# Patient Record
Sex: FEMALE | Race: WHITE | NOT HISPANIC OR LATINO | Employment: FULL TIME | URBAN - METROPOLITAN AREA
[De-identification: names, ages, dates, MRNs, and addresses within clinical notes are randomized per-mention and may not be internally consistent; named-entity substitution may affect disease eponyms.]

---

## 2022-07-08 LAB
LEFT EYE DIABETIC RETINOPATHY: NORMAL
RIGHT EYE DIABETIC RETINOPATHY: NORMAL
SEVERITY (EYE EXAM): NORMAL

## 2022-12-05 ENCOUNTER — OFFICE VISIT (OUTPATIENT)
Dept: FAMILY MEDICINE CLINIC | Facility: CLINIC | Age: 42
End: 2022-12-05

## 2022-12-05 VITALS
TEMPERATURE: 98.8 F | SYSTOLIC BLOOD PRESSURE: 128 MMHG | HEIGHT: 61 IN | WEIGHT: 275 LBS | DIASTOLIC BLOOD PRESSURE: 74 MMHG | OXYGEN SATURATION: 98 % | HEART RATE: 66 BPM | BODY MASS INDEX: 51.92 KG/M2

## 2022-12-05 DIAGNOSIS — J40 BRONCHITIS: Primary | ICD-10-CM

## 2022-12-05 PROBLEM — E66.01 MORBID OBESITY WITH BMI OF 50.0-59.9, ADULT (HCC): Status: ACTIVE | Noted: 2022-12-05

## 2022-12-05 RX ORDER — NORETHINDRONE ACETATE AND ETHINYL ESTRADIOL AND FERROUS FUMARATE 1MG-20(21)
1 KIT ORAL DAILY
COMMUNITY
Start: 2022-09-18

## 2022-12-05 RX ORDER — METHYLPREDNISOLONE 4 MG/1
TABLET ORAL
Qty: 21 EACH | Refills: 0
Start: 2022-12-05

## 2022-12-05 NOTE — PATIENT INSTRUCTIONS
Acute Bronchitis   AMBULATORY CARE:   Acute bronchitis  is swelling and irritation in your lungs  It is usually caused by a virus and most often happens in the winter  Bronchitis may also be caused by bacteria or by a chemical irritant, such as smoke  Common symptoms include the following:   Cough that lasts up to 3 weeks, stuffy nose    Hoarseness, sore throat    A fever and chills    Feeling more tired than usual, and body aches    Wheezing or pain when you breathe or cough    Seek care immediately if:   You cough up blood  Your lips or fingernails turn blue  You feel like you are not getting enough air when you breathe  Call your doctor if:   Your symptoms do not go away or get worse, even after treatment  Your cough does not get better within 4 weeks  You have questions or concerns about your condition or care  Medicines: You may  need any of the following:  Cough suppressants  decrease your urge to cough  Decongestants  help loosen mucus in your lungs and make it easier to cough up  This can help you breathe easier  Inhalers  may be given  Your healthcare provider may give you one or more inhalers to help you breathe easier and cough less  An inhaler gives your medicine to open your airways  Ask your healthcare provider to show you how to use your inhaler correctly  Antibiotics  may be given for up to 5 days if your bronchitis is caused by bacteria  Acetaminophen  decreases pain and fever  It is available without a doctor's order  Ask how much to take and how often to take it  Follow directions  Read the labels of all other medicines you are using to see if they also contain acetaminophen, or ask your doctor or pharmacist  Acetaminophen can cause liver damage if not taken correctly  Do not use more than 4 grams (4,000 milligrams) total of acetaminophen in one day  NSAIDs  help decrease swelling and pain or fever   This medicine is available with or without a doctor's order  NSAIDs can cause stomach bleeding or kidney problems in certain people  If you take blood thinner medicine, always ask your healthcare provider if NSAIDs are safe for you  Always read the medicine label and follow directions  Take your medicine as directed  Contact your healthcare provider if you think your medicine is not helping or if you have side effects  Tell him of her if you are allergic to any medicine  Keep a list of the medicines, vitamins, and herbs you take  Include the amounts, and when and why you take them  Bring the list or the pill bottles to follow-up visits  Carry your medicine list with you in case of an emergency  Self-care:   Drink liquids as directed  You may need to drink more liquids than usual to stay hydrated  Ask how much liquid to drink each day and which liquids are best for you  Use a cool mist humidifier  to increase air moisture in your home  This may make it easier for you to breathe and help decrease your cough  Get more rest   Rest helps your body to heal  Slowly start to do more each day  Rest when you feel it is needed  Avoid irritants in the air  Avoid chemicals, fumes, and dust  Wear a face mask if you must work around dust or fumes  Stay inside on days when air pollution levels are high  If you have allergies, stay inside when pollen counts are high  Do not use aerosol products, such as spray-on deodorant, bug spray, and hair spray  Do not smoke or be around others who are smoking  Nicotine and other chemicals in cigarettes and cigars can cause lung damage  Ask your healthcare provider for information if you currently smoke and need help to quit  E-cigarettes or smokeless tobacco still contain nicotine  Talk to your healthcare provider before you use these products  Prevent acute bronchitis:       Ask about vaccines you may need  Get a flu vaccine each year as soon as recommended, usually in September or October   Ask your healthcare provider if you should also get a pneumonia or COVID-19 vaccine  Your healthcare provider can tell you if you should also get other vaccines, and when to get them  Prevent the spread of germs  You can decrease your risk for acute bronchitis and other illnesses by doing the following:    Wash your hands often with soap and water  Carry germ-killing hand lotion or gel with you  You can use the lotion or gel to clean your hands when soap and water are not available  Do not touch your eyes, nose, or mouth unless you have washed your hands first     Always cover your mouth when you cough to prevent the spread of germs  It is best to cough into a tissue or your shirt sleeve instead of into your hand  Ask those around you to cover their mouths when they cough  Try to avoid people who have a cold or the flu  If you are sick, stay away from others as much as possible  Follow up with your doctor as directed:  Write down questions you have so you will remember to ask them during your follow-up visits  © Copyright [employer] 2022 Information is for End User's use only and may not be sold, redistributed or otherwise used for commercial purposes  All illustrations and images included in CareNotes® are the copyrighted property of A D A M , Inc  or Sherwin Baldwin   The above information is an  only  It is not intended as medical advice for individual conditions or treatments  Talk to your doctor, nurse or pharmacist before following any medical regimen to see if it is safe and effective for you

## 2022-12-05 NOTE — PROGRESS NOTES
Name: Kristi Castañeda      : 1980      MRN: [id number]  Encounter Provider: MARCY Valle  Encounter Date: 2022   [facility]    Assessment & Plan     Presentation is consistent with bronchitis, likely viral in origin  Patient recently completed antibiotics in mid November for a sinus infection, no indication for repeat antibiotics today  To assist with respiratory symptoms, provided patient with a Medrol Dosepak from our office supply to initiate today  Also advised patient to continue with rest, hydration, and OTC Mucinex  Discussed what signs and symptoms warrant further medical attention, patient verbalized understanding  1  Bronchitis  -     methylPREDNISolone 4 MG tablet therapy pack; Use as directed on package         Subjective      Primary complaint: chest tightness  Onset of illness: Lingering since end   Corresponding symptoms: cough (non-productive)  Course (improving, worsening, stable): "up and down,,, day to day"  Fever (TMAX): none  Respiratory Symptoms?: wheezing "time to time"  Recent sick contacts: 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  Recent COVID dx/test: has not tested  Hx of similar illnesses?: Had recurrent pneumonia as a child  Treatments?: taking Mucinex DM, minimal relief    Had a virtual visit on  with [place], provided with Augmentin (completed, 10 days)        Review of Systems   Constitutional: Positive for fatigue  HENT: Negative  Negative for congestion, rhinorrhea and sore throat  Eyes: Negative  Respiratory: Positive for cough, chest tightness and wheezing  Cardiovascular: Negative  Gastrointestinal: Negative  Endocrine: Negative  Genitourinary: Negative  Musculoskeletal: Negative  Skin: Negative  Allergic/Immunologic: Negative  Neurological: Negative  Hematological: Negative  Psychiatric/Behavioral: Negative  Current Outpatient Medications on File Prior to Visit   Medication Sig   • Junel FE 1/20 1-20 MG-MCG per tablet Take 1 tablet by mouth daily       Objective     /74 (BP Location: Left arm, Patient Position: Sitting, Cuff Size: Large)   Pulse 66   Temp 98 8 °F (37 1 °C)   Ht 5' 1" (1 549 m)   Wt 125 kg (275 lb)   SpO2 98%   BMI 51 96 kg/m²     Physical Exam  Constitutional:       General: She is not in acute distress  Appearance: Normal appearance  She is ill-appearing  HENT:      Head: Normocephalic  Right Ear: Tympanic membrane normal       Left Ear: Tympanic membrane normal       Nose: Nose normal  No congestion or rhinorrhea  Right Sinus: No maxillary sinus tenderness or frontal sinus tenderness  Left Sinus: No maxillary sinus tenderness or frontal sinus tenderness  Mouth/Throat:      Mouth: Mucous membranes are moist       Pharynx: Oropharynx is clear  No oropharyngeal exudate or posterior oropharyngeal erythema  Eyes:      Conjunctiva/sclera: Conjunctivae normal    Cardiovascular:      Rate and Rhythm: Normal rate and regular rhythm  Pulses: Normal pulses  Heart sounds: Normal heart sounds  Pulmonary:      Effort: Pulmonary effort is normal  No respiratory distress  Breath sounds: Examination of the right-lower field reveals wheezing  Wheezing present  Musculoskeletal:      Cervical back: Normal range of motion  Lymphadenopathy:      Cervical: No cervical adenopathy  Skin:     General: Skin is warm and dry  Findings: No rash  Neurological:      General: No focal deficit present  Mental Status: She is alert and oriented to person, place, and time     Psychiatric:         Mood and Affect: Mood normal          Behavior: Behavior normal        MACRY Olson

## 2022-12-09 ENCOUNTER — OFFICE VISIT (OUTPATIENT)
Dept: FAMILY MEDICINE CLINIC | Facility: CLINIC | Age: 42
End: 2022-12-09

## 2022-12-09 VITALS
OXYGEN SATURATION: 98 % | SYSTOLIC BLOOD PRESSURE: 108 MMHG | WEIGHT: 275 LBS | DIASTOLIC BLOOD PRESSURE: 64 MMHG | TEMPERATURE: 97.3 F | BODY MASS INDEX: 51.92 KG/M2 | HEIGHT: 61 IN | HEART RATE: 62 BPM

## 2022-12-09 DIAGNOSIS — B96.89 ACUTE BACTERIAL BRONCHITIS: Primary | ICD-10-CM

## 2022-12-09 DIAGNOSIS — J20.8 ACUTE BACTERIAL BRONCHITIS: Primary | ICD-10-CM

## 2022-12-09 RX ORDER — ASCORBIC ACID 500 MG
500 TABLET ORAL
COMMUNITY

## 2022-12-09 RX ORDER — DOXYCYCLINE HYCLATE 100 MG/1
100 CAPSULE ORAL EVERY 12 HOURS SCHEDULED
Qty: 14 CAPSULE | Refills: 0 | Status: SHIPPED | OUTPATIENT
Start: 2022-12-09 | End: 2022-12-16

## 2022-12-09 NOTE — PROGRESS NOTES
Name: Niraj Chaparro      : 1980      MRN: [id number]  Encounter Provider: MARCY De La Cruz  Encounter Date: 2022   Encounter department: [facility] [address] [facility]    Assessment & Plan     Due to length of symptoms and failure of condition to improve with p o  steroids, current differentials include: Bacterial bronchitis vs viral bronchitis vs unspecified URI  Based on shared decision making, patient agreed to start doxycycline twice daily x7 days in conjunction with the remainder of her methylprednisolone pack  Advised patient to continue with conservative measures: rest, hydration, and OTC Mucinex  Discussed what signs and symptoms warrant emergent medical care  Patient verbalized understanding  1  Acute bacterial bronchitis  -     doxycycline hyclate (VIBRAMYCIN) 100 mg capsule; Take 1 capsule (100 mg total) by mouth every 12 (twelve) hours for 7 days         Subjective      Saw pt on Monday, dx with bronchitis, started on PO steroid pack    Primary complaint: cough (productive, think white color), chest tightness  Onset of illness: lingering since November   Course (improving, worsening, stable): "still worsening"  Fever (TMAX): no recent fevers  Respiratory Symptoms?: "more labored breathing"  Hx of similar illnesses?: "hx of chest illnesses"  Recent antibiotics: Was on Augmentin in early November   Treatments?: mucinex dm and steroid pack    minimal relief      Review of Systems   Constitutional: Positive for fatigue  HENT: Negative  Negative for congestion, sinus pressure, sinus pain and sore throat  Eyes: Negative  Respiratory: Positive for cough, chest tightness and shortness of breath  Cardiovascular: Negative  Gastrointestinal: Negative  Endocrine: Negative  Genitourinary: Negative  Musculoskeletal: Negative  Skin: Negative  Allergic/Immunologic: Negative  Neurological: Negative  Hematological: Negative  Psychiatric/Behavioral: Negative  Current Outpatient Medications on File Prior to Visit   Medication Sig   • ascorbic acid (VITAMIN C) 500 MG tablet Take 500 mg by mouth   • Junel FE 1/20 1-20 MG-MCG per tablet Take 1 tablet by mouth daily   • methylPREDNISolone 4 MG tablet therapy pack Use as directed on package       Objective     /64 (BP Location: Left arm, Patient Position: Sitting, Cuff Size: Large)   Pulse 62   Temp (!) 97 3 °F (36 3 °C)   Ht 5' 1" (1 549 m)   Wt 125 kg (275 lb)   SpO2 98%   BMI 51 96 kg/m²     Physical Exam  Constitutional:       General: She is not in acute distress  Appearance: Normal appearance  She is obese  She is ill-appearing  HENT:      Head: Normocephalic  Right Ear: Tympanic membrane normal       Left Ear: Tympanic membrane normal       Nose: Nose normal  No congestion or rhinorrhea  Mouth/Throat:      Mouth: Mucous membranes are moist       Pharynx: Oropharynx is clear  No oropharyngeal exudate or posterior oropharyngeal erythema  Eyes:      Conjunctiva/sclera: Conjunctivae normal    Cardiovascular:      Rate and Rhythm: Normal rate and regular rhythm  Pulses: Normal pulses  Heart sounds: Normal heart sounds  Pulmonary:      Effort: Pulmonary effort is normal       Breath sounds: Wheezing present  Musculoskeletal:      Cervical back: Normal range of motion  Lymphadenopathy:      Cervical: No cervical adenopathy  Skin:     General: Skin is warm and dry  Findings: No rash  Neurological:      General: No focal deficit present  Mental Status: She is alert and oriented to person, place, and time     Psychiatric:         Mood and Affect: Mood normal          Behavior: Behavior normal        MARCY Winter

## 2022-12-09 NOTE — PATIENT INSTRUCTIONS
Doxycycline (By injection)   Doxycycline ([id number])  Treats infections  This medicine is a tetracycline antibiotic  Brand Name(s): Doxy 100, Doxy 100 Novaplus, PremierPro Rx Doxy 100   There may be other brand names for this medicine  When This Medicine Should Not Be Used: This medicine is not right for everyone  You should not receive it if you had an allergic reaction to doxycycline or any tetracycline antibiotic, or if you are pregnant  How to Use This Medicine:   Injectable  Your doctor will prescribe your dose and schedule  This medicine is given through a needle placed in a vein  A nurse or other health provider will give you this medicine  Missed dose: Call your doctor or pharmacist for instructions  Drugs and Foods to Avoid:   Ask your doctor or pharmacist before using any other medicine, including over-the-counter medicines, vitamins, and herbal products  Some medicines can affect how doxycycline works  Tell your doctor if you are also using the following:   Penicillin  A blood thinner (such as warfarin)  Warnings While Using This Medicine:   Tell your doctor if you are breastfeeding, or if you have liver disease or lupus  This medicine can cause diarrhea  Call your doctor if the diarrhea becomes severe, does not stop, or is bloody  Do not take any medicine to stop diarrhea until you have talked to your doctor  Diarrhea can occur 2 months or more after you stop taking this medicine  This medicine may make your skin more sensitive to sunlight  Wear sunscreen  Do not use sunlamps or tanning beds  Possible Side Effects While Using This Medicine:   Call your doctor right away if you notice any of these side effects:   Allergic reaction: Itching or hives, swelling in your face or hands, swelling or tingling in your mouth or throat, chest tightness, trouble breathing  Blistering, peeling, red skin rash  Severe diarrhea that may be bloody, stomach cramps, fever  If you notice these less serious side effects, talk with your doctor:   Mild diarrhea, nausea, vomiting, loss of appetite  Trouble swallowing  If you notice other side effects that you think are caused by this medicine, tell your doctor  Call your doctor for medical advice about side effects  You may report side effects to FDA at 5-011-FDA-6987    © Copyright [employer] 2022 Information is for End User's use only and may not be sold, redistributed or otherwise used for commercial purposes  The above information is an  only  It is not intended as medical advice for individual conditions or treatments  Talk to your doctor, nurse or pharmacist before following any medical regimen to see if it is safe and effective for you

## 2022-12-21 ENCOUNTER — RA CDI HCC (OUTPATIENT)
Dept: OTHER | Facility: HOSPITAL | Age: 42
End: 2022-12-21

## 2022-12-21 NOTE — PROGRESS NOTES
[facility]  [facility]       Chart reviewed, no opportunity found: CHART REVIEWED, NO OPPORTUNITY FOUND        Patients Insurance        Commercial Insurance: [address] [place] [address]

## 2022-12-27 PROBLEM — R73.09 ELEVATED HEMOGLOBIN A1C MEASUREMENT: Status: ACTIVE | Noted: 2022-12-27

## 2022-12-27 NOTE — PROGRESS NOTES
ADULT ANNUAL [address] IN PARTNERSHIP WITH [facility]    NAME: Toribio Rios  AGE: 38 y o  SEX: female  : 1980     DATE: 2022     Assessment and Plan:     Presents to establish care, primary concern is obesity    Last PAP 3/7/22 per [facility] records ([facility]), mammogram up to date    Baseline labs ordered and collected today    A1c was 7 2 today, increased from 6 3 last year  If plasma glucose comes back elevated, a diagnosis of diabetes type 2 will be confirmed  Offered to start patient on metformin today, patient stated that she took this medication in the past and had some serious gastrointestinal side effects  Patient desires to start a 3-month regimen of diet and exercise with care management team   We will follow-up in 3 months for repeat A1c and possible med iniatition       Follow-up in 3 months for repeat A1c      Problem List Items Addressed This Visit        Other    Morbid obesity with BMI of 50 0-59 9, adult ([facility] [address] )    Relevant Orders    Comprehensive metabolic panel    Lipid panel    POCT hemoglobin A1c (Completed)    Ambulatory referral to complex care management program    TSH, 3rd generation with Free T4 reflex    Metabolic syndrome    Relevant Orders    Comprehensive metabolic panel    Lipid panel    TSH, 3rd generation with Free T4 reflex    Elevated hemoglobin A1c measurement    Relevant Orders    POCT hemoglobin A1c (Completed)   Other Visit Diagnoses     Annual physical exam    -  Primary    Relevant Orders    CBC and differential    Comprehensive metabolic panel    Lipid panel    TSH, 3rd generation with Free T4 reflex    Need for hepatitis C screening test        Relevant Orders    Hepatitis C Ab W/Refl To HCV RNA, Qn, PCR    Screening for HIV (human immunodeficiency virus)        Relevant Orders    Human Immunodeficiency Virus 1/2 Antigen / Antibody ( Fourth Generation) with Reflex Testing          Immunizations and preventive care screenings were discussed with patient today  Appropriate education was printed on patient's after visit summary  Counseling:  Exercise: the importance of regular exercise/physical activity was discussed  Recommend exercise 3-5 times per week for at least 30 minutes  BMI Counseling: There is no height or weight on file to calculate BMI  The BMI is above normal  Nutrition recommendations include decreasing portion sizes and consuming healthier snacks  Exercise recommendations include exercising 3-5 times per week  No pharmacotherapy was ordered  Rationale for BMI follow-up plan is due to patient being overweight or obese  Return in about 3 months (around 3/29/2023) for Recheck  Chief Complaint:     Chief Complaint   Patient presents with   • Annual Exam      History of Present Illness:     Adult Annual Physical   Patient here for a comprehensive physical exam  The patient reports problems - weight gain  Diet and Physical Activity  Diet/Nutrition: poor diet  Exercise: no formal exercise  Depression Screening  PHQ-2/9 Depression Screening         General Health  Sleep: sleeps well  Hearing: normal - bilateral   Vision: wears glasses  Dental: regular dental visits  Review of Systems:     Review of Systems   Constitutional: Negative  HENT: Negative  Eyes: Negative  Respiratory: Negative  Cardiovascular: Negative  Gastrointestinal: Negative  Endocrine: Negative  Genitourinary: Negative  Musculoskeletal: Negative  Skin: Negative  Allergic/Immunologic: Negative  Neurological: Negative  Negative for weakness and numbness  Hematological: Negative  Psychiatric/Behavioral: Negative         Past Medical History:     Past Medical History:   Diagnosis Date   • Allergic     seasonal   • Obesity       Past Surgical History:     Past Surgical History:   Procedure Laterality Date   •  SECTION  2013   • TUBAL LIGATION  2014      Social History:     Social History     Socioeconomic History   • Marital status: /Civil Union     Spouse name: None   • Number of children: None   • Years of education: None   • Highest education level: None   Occupational History   • None   Tobacco Use   • Smoking status: Never   • Smokeless tobacco: Never   Substance and Sexual Activity   • Alcohol use: Yes     Alcohol/week: 2 0 standard drinks     Types: 2 Glasses of wine per week   • Drug use: Never   • Sexual activity: Yes     Partners: Male     Birth control/protection: Other   Other Topics Concern   • None   Social History Narrative   • None     Social Determinants of Health     Financial Resource Strain: Not on file   Food Insecurity: Not on file   Transportation Needs: Not on file   Physical Activity: Not on file   Stress: Not on file   Social Connections: Not on file   Intimate Partner Violence: Not on file   Housing Stability: Not on file      Family History:     Family History   Problem Relation Age of Onset   • Hypertension Mother    • Arthritis Mother            • Stroke Maternal Grandmother            • Coronary artery disease Maternal Aunt               Current Medications:     Current Outpatient Medications   Medication Sig Dispense Refill   • ascorbic acid (VITAMIN C) 500 MG tablet Take 500 mg by mouth     • Junel FE  1-20 MG-MCG per tablet Take 1 tablet by mouth daily     • loratadine 5 mg/5 mL syrup Take 10 mg by mouth daily     • methylPREDNISolone 4 MG tablet therapy pack Use as directed on package (Patient not taking: Reported on 2022) 21 each 0     No current facility-administered medications for this visit        Allergies:     No Known Allergies   Physical Exam:     BP 96/58 (BP Location: Left arm, Patient Position: Sitting)   Pulse 86   Temp 97 5 °F (36 4 °C)   Ht 5' 1" (1 549 m)   Wt 123 kg (272 lb)   SpO2 96%   BMI 51 39 kg/m²     Physical Exam  Vitals and nursing note reviewed  Constitutional:       Appearance: Normal appearance  She is obese  HENT:      Head: Normocephalic  Right Ear: Tympanic membrane normal       Left Ear: Tympanic membrane normal       Nose: Nose normal  No congestion  Mouth/Throat:      Mouth: Mucous membranes are moist       Pharynx: Oropharynx is clear  No oropharyngeal exudate  Eyes:      Extraocular Movements: Extraocular movements intact  Conjunctiva/sclera: Conjunctivae normal       Pupils: Pupils are equal, round, and reactive to light  Cardiovascular:      Rate and Rhythm: Normal rate and regular rhythm  Pulses: Normal pulses  Heart sounds: Normal heart sounds  No murmur heard  Pulmonary:      Effort: Pulmonary effort is normal  No respiratory distress  Breath sounds: Normal breath sounds  No wheezing  Abdominal:      General: Bowel sounds are normal       Palpations: Abdomen is soft  Tenderness: There is no abdominal tenderness  Musculoskeletal:         General: No swelling, tenderness, deformity or signs of injury  Normal range of motion  Cervical back: Normal range of motion and neck supple  No tenderness  Right lower leg: No edema  Left lower leg: No edema  Lymphadenopathy:      Cervical: No cervical adenopathy  Skin:     General: Skin is warm and dry  Capillary Refill: Capillary refill takes less than 2 seconds  Findings: No rash  Neurological:      General: No focal deficit present  Mental Status: She is alert and oriented to person, place, and time  Cranial Nerves: No cranial nerve deficit  Sensory: No sensory deficit  Motor: No weakness        Coordination: Coordination normal       Gait: Gait normal       Deep Tendon Reflexes: Reflexes normal    Psychiatric:         Mood and Affect: Mood normal          Behavior: Behavior normal           Monique Earing, [address] PARTNERSHIP WITH [facility]

## 2022-12-27 NOTE — PATIENT INSTRUCTIONS
Wellness Visit for Adults   AMBULATORY CARE:   A wellness visit  is when you see your healthcare provider to get screened for health problems  Your healthcare provider will also give you advice on how to stay healthy  Write down your questions so you remember to ask them  Ask your healthcare provider how often you should have a wellness visit  What happens at a wellness visit:  Your healthcare provider will ask about your health, and your family history of health problems  This includes high blood pressure, heart disease, and cancer  He or she will ask if you have symptoms that concern you, if you smoke, and about your mood  You may also be asked about your intake of medicines, supplements, food, and alcohol  Any of the following may be done: Your weight  will be checked  Your height may also be checked so your body mass index (BMI) can be calculated  Your BMI shows if you are at a healthy weight  Your blood pressure  and heart rate will be checked  Your temperature may also be checked  Blood and urine tests  may be done  Blood tests may be done to check your cholesterol levels  Abnormal cholesterol levels increase your risk for heart disease and stroke  You may also need a blood or urine test to check for diabetes if you are at increased risk  Urine tests may be done to look for signs of an infection or kidney disease  A physical exam  includes checking your heartbeat and lungs with a stethoscope  Your healthcare provider may also check your skin to look for sun damage  Screening tests  may be recommended  A screening test is done to check for diseases that may not cause symptoms  The screening tests you may need depend on your age, gender, family history, and lifestyle habits  For example, colorectal screening may be recommended if you are 48years old or older  Screening tests you need if you are a woman:   A Pap smear  is used to screen for cervical cancer   Pap smears are usually done every 3 to 5 years depending on your age  You may need them more often if you have had abnormal Pap smear test results in the past  Ask your healthcare provider how often you should have a Pap smear  A mammogram  is an x-ray of your breasts to screen for breast cancer  Experts recommend mammograms every 2 years starting at age 48 years  You may need a mammogram at age 52 years or younger if you have an increased risk for breast cancer  Talk to your healthcare provider about when you should start having mammograms and how often you need them  Vaccines you may need:   Get an influenza vaccine  every year  The influenza vaccine protects you from the flu  Several types of viruses cause the flu  The viruses change over time, so new vaccines are made each year  Get a tetanus-diphtheria (Td) booster vaccine  every 10 years  This vaccine protects you against tetanus and diphtheria  Tetanus is a severe infection that may cause painful muscle spasms and lockjaw  Diphtheria is a severe bacterial infection that causes a thick covering in the back of your mouth and throat  Get a human papillomavirus (HPV) vaccine  if you are female and aged 23 to 32 or male 23 to 24 and never received it  This vaccine protects you from HPV infection  HPV is the most common infection spread by sexual contact  HPV may also cause vaginal, penile, and anal cancers  Get a pneumococcal vaccine  if you are aged 72 years or older  The pneumococcal vaccine is an injection given to protect you from pneumococcal disease  Pneumococcal disease is an infection caused by pneumococcal bacteria  The infection may cause pneumonia, meningitis, or an ear infection  Get a shingles vaccine  if you are 60 or older, even if you have had shingles before  The shingles vaccine is an injection to protect you from the varicella-zoster virus  This is the same virus that causes chickenpox   Shingles is a painful rash that develops in people who had chickenpox or have been exposed to the virus  How to eat healthy:  My Plate is a model for planning healthy meals  It shows the types and amounts of foods that should go on your plate  Fruits and vegetables make up about half of your plate, and grains and protein make up the other half  A serving of dairy is included on the side of your plate  The amount of calories and serving sizes you need depends on your age, gender, weight, and height  Examples of healthy foods are listed below:  Eat a variety of vegetables  such as dark green, red, and orange vegetables  You can also include canned vegetables low in sodium (salt) and frozen vegetables without added butter or sauces  Eat a variety of fresh fruits , canned fruit in 100% juice, frozen fruit, and dried fruit  Include whole grains  At least half of the grains you eat should be whole grains  Examples include whole-wheat bread, wheat pasta, brown rice, and whole-grain cereals such as oatmeal     Eat a variety of protein foods such as seafood (fish and shellfish), lean meat, and poultry without skin (turkey and chicken)  Examples of lean meats include pork leg, shoulder, or tenderloin, and beef round, sirloin, tenderloin, and extra lean ground beef  Other protein foods include eggs and egg substitutes, beans, peas, soy products, nuts, and seeds  Choose low-fat dairy products such as skim or 1% milk or low-fat yogurt, cheese, and cottage cheese  Limit unhealthy fats  such as butter, hard margarine, and shortening  Exercise:  Exercise at least 30 minutes per day on most days of the week  Some examples of exercise include walking, biking, dancing, and swimming  You can also fit in more physical activity by taking the stairs instead of the elevator or parking farther away from stores  Include muscle strengthening activities 2 days each week  Regular exercise provides many health benefits   It helps you manage your weight, and decreases your risk for type 2 diabetes, heart disease, stroke, and high blood pressure  Exercise can also help improve your mood  Ask your healthcare provider about the best exercise plan for you  General health and safety guidelines:   Do not smoke  Nicotine and other chemicals in cigarettes and cigars can cause lung damage  Ask your healthcare provider for information if you currently smoke and need help to quit  E-cigarettes or smokeless tobacco still contain nicotine  Talk to your healthcare provider before you use these products  Limit alcohol  A drink of alcohol is 12 ounces of beer, 5 ounces of wine, or 1½ ounces of liquor  Lose weight, if needed  Being overweight increases your risk of certain health conditions  These include heart disease, high blood pressure, type 2 diabetes, and certain types of cancer  Protect your skin  Do not sunbathe or use tanning beds  Use sunscreen with a SPF 15 or higher  Apply sunscreen at least 15 minutes before you go outside  Reapply sunscreen every 2 hours  Wear protective clothing, hats, and sunglasses when you are outside  Drive safely  Always wear your seatbelt  Make sure everyone in your car wears a seatbelt  A seatbelt can save your life if you are in an accident  Do not use your cell phone when you are driving  This could distract you and cause an accident  Pull over if you need to make a call or send a text message  Practice safe sex  Use latex condoms if are sexually active and have more than one partner  Your healthcare provider may recommend screening tests for sexually transmitted infections (STIs)  Wear helmets, lifejackets, and protective gear  Always wear a helmet when you ride a bike or motorcycle, go skiing, or play sports that could cause a head injury  Wear protective equipment when you play sports  Wear a lifejacket when you are on a boat or doing water sports      © Copyright [employer] 2022 Information is for End User's use only and may not be sold, redistributed or otherwise used for commercial purposes  All illustrations and images included in CareNotes® are the copyrighted property of A D A M , Inc  or Sherwin Young[employer]  The above information is an  only  It is not intended as medical advice for individual conditions or treatments  Talk to your doctor, nurse or pharmacist before following any medical regimen to see if it is safe and effective for you

## 2022-12-28 ENCOUNTER — TELEPHONE (OUTPATIENT)
Dept: ADMINISTRATIVE | Facility: OTHER | Age: 42
End: 2022-12-28

## 2022-12-28 NOTE — TELEPHONE ENCOUNTER
Upon review of the In Basket request we were able to locate, review, and update the patient chart as requested for Pap Smear (HPV) aka Cervical Cancer Screening  Any additional questions or concerns should be emailed to the Practice Liaisons via the appropriate education email address, please do not reply via In Basket      Thank you  Andreas Oshea

## 2022-12-28 NOTE — TELEPHONE ENCOUNTER
----- Message from Mat Vincent sent at 12/28/2022  9:41 AM EST -----  Regarding: Care Gap Request  12/28/22 9:41 AM    Hello, our patient has had Pap Smear (HPV) aka Cervical Cancer Screening completed/performed  Please assist in updating the patient chart by pulling the Care Everywhere (CE) document  The date of service is 3/7/2022       Thank you,  Mat Smith

## 2022-12-29 ENCOUNTER — PATIENT OUTREACH (OUTPATIENT)
Dept: FAMILY MEDICINE CLINIC | Facility: CLINIC | Age: 42
End: 2022-12-29

## 2022-12-29 ENCOUNTER — OFFICE VISIT (OUTPATIENT)
Dept: FAMILY MEDICINE CLINIC | Facility: CLINIC | Age: 42
End: 2022-12-29

## 2022-12-29 VITALS
HEIGHT: 61 IN | OXYGEN SATURATION: 96 % | WEIGHT: 272 LBS | BODY MASS INDEX: 51.35 KG/M2 | SYSTOLIC BLOOD PRESSURE: 96 MMHG | DIASTOLIC BLOOD PRESSURE: 58 MMHG | HEART RATE: 86 BPM | TEMPERATURE: 97.5 F

## 2022-12-29 DIAGNOSIS — Z00.00 ANNUAL PHYSICAL EXAM: Primary | ICD-10-CM

## 2022-12-29 DIAGNOSIS — R73.09 ELEVATED HEMOGLOBIN A1C MEASUREMENT: ICD-10-CM

## 2022-12-29 DIAGNOSIS — E88.81 METABOLIC SYNDROME: ICD-10-CM

## 2022-12-29 DIAGNOSIS — E66.01 MORBID OBESITY WITH BMI OF 50.0-59.9, ADULT (HCC): ICD-10-CM

## 2022-12-29 DIAGNOSIS — Z11.59 NEED FOR HEPATITIS C SCREENING TEST: ICD-10-CM

## 2022-12-29 DIAGNOSIS — Z11.4 SCREENING FOR HIV (HUMAN IMMUNODEFICIENCY VIRUS): ICD-10-CM

## 2022-12-29 LAB — SL AMB POCT HEMOGLOBIN AIC: 7.2 (ref ?–6.5)

## 2022-12-29 RX ORDER — LORATADINE ORAL 5 MG/5ML
10 SOLUTION ORAL DAILY
COMMUNITY
End: 2023-01-05

## 2022-12-29 NOTE — PROGRESS NOTES
Patient was seen by provider in office to establish care  From chart it looks as if patient has not had a PCP for years  Gyn did lab work in April of 2021 and patient had A1c of 6 3  I did not see notes of having followed up with endocrine or PCP until today  A1c is now 7 2  Given this information I was consulted to provide lifestyle modification counseling  I introduced myself and gave a brief overview of the program  Patient consented to participation  We talked about what she drinks in a day and I suggested she increase he water consumption until we talk in greater depth next week on 1/5  She wanted to try changing her eating before starting on medications

## 2022-12-30 ENCOUNTER — TELEPHONE (OUTPATIENT)
Dept: FAMILY MEDICINE CLINIC | Facility: CLINIC | Age: 42
End: 2022-12-30

## 2022-12-30 LAB
ALBUMIN SERPL-MCNC: 3.8 G/DL (ref 3.6–5.1)
ALBUMIN/GLOB SERPL: 1.1 (CALC) (ref 1–2.5)
ALP SERPL-CCNC: 58 U/L (ref 31–125)
ALT SERPL-CCNC: 42 U/L (ref 6–29)
AST SERPL-CCNC: 62 U/L (ref 10–30)
BASOPHILS # BLD AUTO: 43 CELLS/UL (ref 0–200)
BASOPHILS NFR BLD AUTO: 0.6 %
BILIRUB SERPL-MCNC: 0.4 MG/DL (ref 0.2–1.2)
BUN SERPL-MCNC: 12 MG/DL (ref 7–25)
BUN/CREAT SERPL: ABNORMAL (CALC) (ref 6–22)
CALCIUM SERPL-MCNC: 9.4 MG/DL (ref 8.6–10.2)
CHLORIDE SERPL-SCNC: 104 MMOL/L (ref 98–110)
CHOLEST SERPL-MCNC: 230 MG/DL
CHOLEST/HDLC SERPL: 2.7 (CALC)
CO2 SERPL-SCNC: 21 MMOL/L (ref 20–32)
CREAT SERPL-MCNC: 0.65 MG/DL (ref 0.5–0.99)
EOSINOPHIL # BLD AUTO: 298 CELLS/UL (ref 15–500)
EOSINOPHIL NFR BLD AUTO: 4.2 %
ERYTHROCYTE [DISTWIDTH] IN BLOOD BY AUTOMATED COUNT: 13.3 % (ref 11–15)
GFR/BSA.PRED SERPLBLD CYS-BASED-ARV: 111 ML/MIN/1.73M2
GLOBULIN SER CALC-MCNC: 3.5 G/DL (CALC) (ref 1.9–3.7)
GLUCOSE SERPL-MCNC: 138 MG/DL (ref 65–99)
HCT VFR BLD AUTO: 40.8 % (ref 35–45)
HCV AB S/CO SERPL IA: <0.02
HCV AB SERPL QL IA: NORMAL
HDLC SERPL-MCNC: 86 MG/DL
HGB BLD-MCNC: 14 G/DL (ref 11.7–15.5)
HIV 1+2 AB+HIV1 P24 AG SERPL QL IA: NORMAL
LDLC SERPL CALC-MCNC: 118 MG/DL (CALC)
LYMPHOCYTES # BLD AUTO: 1889 CELLS/UL (ref 850–3900)
LYMPHOCYTES NFR BLD AUTO: 26.6 %
MCH RBC QN AUTO: 28.4 PG (ref 27–33)
MCHC RBC AUTO-ENTMCNC: 34.3 G/DL (ref 32–36)
MCV RBC AUTO: 82.8 FL (ref 80–100)
MONOCYTES # BLD AUTO: 348 CELLS/UL (ref 200–950)
MONOCYTES NFR BLD AUTO: 4.9 %
NEUTROPHILS # BLD AUTO: 4523 CELLS/UL (ref 1500–7800)
NEUTROPHILS NFR BLD AUTO: 63.7 %
NONHDLC SERPL-MCNC: 144 MG/DL (CALC)
PLATELET # BLD AUTO: 318 THOUSAND/UL (ref 140–400)
PMV BLD REES-ECKER: 11 FL (ref 7.5–12.5)
POTASSIUM SERPL-SCNC: 4.1 MMOL/L (ref 3.5–5.3)
PROT SERPL-MCNC: 7.3 G/DL (ref 6.1–8.1)
RBC # BLD AUTO: 4.93 MILLION/UL (ref 3.8–5.1)
SODIUM SERPL-SCNC: 137 MMOL/L (ref 135–146)
TRIGL SERPL-MCNC: 143 MG/DL
TSH SERPL-ACNC: 1.35 MIU/L
WBC # BLD AUTO: 7.1 THOUSAND/UL (ref 3.8–10.8)

## 2023-01-03 PROBLEM — E11.9 DIABETES MELLITUS, TYPE 2 (HCC): Status: ACTIVE | Noted: 2023-01-03

## 2023-01-03 PROBLEM — R73.09 ELEVATED HEMOGLOBIN A1C MEASUREMENT: Status: RESOLVED | Noted: 2022-12-27 | Resolved: 2023-01-03

## 2023-01-04 ENCOUNTER — PATIENT OUTREACH (OUTPATIENT)
Dept: FAMILY MEDICINE CLINIC | Facility: CLINIC | Age: 43
End: 2023-01-04

## 2023-01-05 ENCOUNTER — CLINICAL SUPPORT (OUTPATIENT)
Dept: FAMILY MEDICINE CLINIC | Facility: CLINIC | Age: 43
End: 2023-01-05

## 2023-01-05 ENCOUNTER — PATIENT OUTREACH (OUTPATIENT)
Dept: FAMILY MEDICINE CLINIC | Facility: CLINIC | Age: 43
End: 2023-01-05

## 2023-01-05 DIAGNOSIS — E11.65 TYPE 2 DIABETES MELLITUS WITH HYPERGLYCEMIA, WITHOUT LONG-TERM CURRENT USE OF INSULIN (HCC): Primary | ICD-10-CM

## 2023-01-05 DIAGNOSIS — E11.65 TYPE 2 DIABETES MELLITUS WITH HYPERGLYCEMIA, WITHOUT LONG-TERM CURRENT USE OF INSULIN (HCC): ICD-10-CM

## 2023-01-05 RX ORDER — LORATADINE 10 MG/1
10 TABLET ORAL DAILY
COMMUNITY

## 2023-01-05 NOTE — PROGRESS NOTES
[address]    Lynette Flanagan is a 38 y o  female who was referred to the pharmacist for newly diagnosed Type 2 diabetes education and management, referred by MARCY Gonzales   Telemedicine consent  The patient was identified by name and date of birth  Lynette Flanagan was informed that this is a telemedicine visit and that the visit is being conducted through Telephone  My office door was closed  No one else was in the room  She acknowledged consent and understanding of privacy and security of the video platform  The patient has agreed to participate and understands they can discontinue the visit at any time  Assessment/ Plan     1  Type 2 Diabetes:  • Goal A1c <6 5% individualized based on age and duration  Most recent A1c above at 7 2% (12/29/22)  New diagnosis of Type 2 diabetes  • Reported Home SMBG  - None- Needs glucometer and supplies  • Complications:  o Microvascular complications: None  o Macrovascular complications: None   • Current Diabetes Regimen:  o None  o Tried metformin IR in past for PCOS but did not tolerate     Changes to Medication Regimen: If PCP is in agreement with plan  · Initiate Ozempic 0 25 mg x 4 weeks 0 5 mg weekly  No hx pancreatitis, MEN2, or MTC  · Initiate One touch verio glucometer      • Most recent labs and diabetes goals discussed   • Materials Provided: None today   • Labs Ordered: None; urine microalbumin next in office visit     2  Additional Therapies:  • Statin: no - statin therapy is indicated  Will discuss during future call  • ACEI/ARB: no- not indicated at this time; BP controleld   • ASA: no- not indicated for primary prevention     3  Health Maintenance:  • Eye Exam: due  • Foot Exam: due  • Urine Microalbumin: due    4  Medication Reconciliation:   • Medication list reviewed with pt at today's visit    • Medication list updated to reflect medications pt is currently taking     5  Hyperlipidemia without clinical ASCVD event:   2018 ACC/AHA lipid guidelines recommend moderate statin  • Patient currently on no intensity and tolerating well without side effects  • Lipid panel elevated/LFTs acceptable  6  HTN:   BP goal less than 130/80 mmHg  • In office BP below goal, HR acceptable  Monitoring: BG every other day fasting    Follow-up: 3 weeks     Subjective     1  DM Medication Adherence/ Tolerability:  • None currently      Medications Tried in Past:  · Metformin 500 mg TID for PCOS  Did not tolerate- did not absorb medication  2  Lifestyle:   • Working with care management for diet and lifestyle adjustments  3  Home monitoring devices  • Glucometer: No, Ordered one touch veroi  • CGM: No  • BP monitor: Unknown - in office BP controlled     Objective       Current Outpatient Medications   Medication Instructions   • ascorbic acid (VITAMIN C) 500 mg, Oral   • Junel FE 1/20 1-20 MG-MCG per tablet 1 tablet, Oral, Daily   • loratadine (LORATADINE) 10 mg, Oral, Daily   • methylPREDNISolone 4 MG tablet therapy pack Use as directed on package     No Known Allergies    Immunization History   Administered Date(s) Administered   • MMR 04/17/2013   • Tdap 04/17/2013       I have reviewed the patient's allergies, medications and history as noted in the electronic medical record and updated as necessary  Lab Results   Component Value Date    HGBA1C 7 2 (A) 12/29/2022    HGBA1C 6 3 (H) 04/02/2021       Blood Glucose Readings  The patient is currently checking blood glucose 0 times per day  Patient does not report with SMBG logs      ASCVD Risk:  The 10-year ASCVD risk score (Hannah HICKS, et al , 2019) is: 0 5%    Values used to calculate the score:      Age: 38 years      Sex: Female      Is Non- : No      Diabetic: Yes      Tobacco smoker: No      Systolic Blood Pressure: 96 mmHg      Is BP treated: No      HDL Cholesterol: 86 mg/dL      Total Cholesterol: 230 mg/dL     Vitals: There were no vitals filed for this visit      Labs:    Lab Results   Component Value Date    SODIUM 137 12/29/2022    K 4 1 12/29/2022    EGFR 111 12/29/2022    CREATININE 0 65 12/29/2022     Pharmacist Tracking Tool     Pharmacist Tracking Tool  Reason For Outreach: Embedded Pharmacist  Demographics:  Intervention Method: Phone  Type of Intervention: New  Topics Addressed: Diabetes  Pharmacologic Interventions: Medication Initiation  Non-Pharmacologic Interventions: Disease state education and Medication/Device education  Time:  Direct Patient Care: 30 mins  Care Coordination: 15 mins  Recommendation Recipient: Patient/Caregiver and Provider  Outcome: Accepted

## 2023-01-06 RX ORDER — BLOOD-GLUCOSE METER
KIT MISCELLANEOUS
Qty: 1 KIT | Refills: 0 | Status: SHIPPED | OUTPATIENT
Start: 2023-01-06

## 2023-01-06 RX ORDER — SEMAGLUTIDE 1.34 MG/ML
INJECTION, SOLUTION SUBCUTANEOUS
Qty: 1.5 ML | Refills: 1 | Status: SHIPPED | OUTPATIENT
Start: 2023-01-06 | End: 2023-03-03

## 2023-01-06 RX ORDER — BLOOD SUGAR DIAGNOSTIC
STRIP MISCELLANEOUS
Qty: 100 EACH | Refills: 3 | Status: SHIPPED | OUTPATIENT
Start: 2023-01-06

## 2023-01-06 RX ORDER — LANCETS 33 GAUGE
EACH MISCELLANEOUS
Qty: 100 EACH | Refills: 3 | Status: SHIPPED | OUTPATIENT
Start: 2023-01-06

## 2023-01-07 NOTE — PROGRESS NOTES
Spoke with patient during scheduled outreach  We talked about the diagnosis of Diabetes which I had shared with her when we talked briefly the night before  She said that she is not surprised, but was upset that she was not able to change it sooner  I had asked her about the blood work that was done 4/21 that showed she was pre-diabetic from her gyn  The patient had told me she asked for those test and saw the results online, but that no one followed up with her about what they meant  She also was not informed that she should talk to a PCP or endocrinologist  As mentioned previously to her the upside of the diagnosis is that she does not need to wait to get on a medication that not only can help regulate her BG, but can also help with weight loss when combined with diet and exercise  She currently drinks 3-4 bottles of water, but also gatorade, and coffee although only 1 cup a day  I asked that she not drink gatorade that unless she is working out and sweating a lot the high sodium content and calories are not helpful  I suggested she focus on drinking 80oz of water a day  I will talk more with her at the next outreach regarding nutrition and see if she received the whichever diabetic medication her and the clinical pharmacist decide on

## 2023-01-07 NOTE — PROGRESS NOTES
Patient reached out regarding appointment scheduled for following day  She mentioned that she saw the bloodwork, but did not see any messages from James Hernandez  Initially, I explained that we could talk more about everything during our call on Thursday, but that yes as was expected the lab work did confirm DM II as the provider had told her it more than likely would  I did explain that although this was not what she wanted to hear the good news is that I could get her set up with the clinical pharmacist who would work with her to find a GLP that would help not only to regulate her BG, but also help in the efforts to lose weight  I did not go into further detail as we had an appointment scheduled, but let her know that as soon as we were done speaking tomorrow she could talk right away with Chelsea Smith and start moving forward to better health  Next outreach tomorrow to talk further about next steps

## 2023-01-09 DIAGNOSIS — E11.65 TYPE 2 DIABETES MELLITUS WITH HYPERGLYCEMIA, WITHOUT LONG-TERM CURRENT USE OF INSULIN (HCC): Primary | ICD-10-CM

## 2023-01-09 RX ORDER — BLOOD-GLUCOSE SENSOR
1 EACH MISCELLANEOUS
Qty: 2 EACH | Refills: 11 | Status: SHIPPED | OUTPATIENT
Start: 2023-01-09

## 2023-01-09 NOTE — TELEPHONE ENCOUNTER
Patient called clinical pharmacist  She decided she would like to try to Continuous glucose monitor instead of fingersticks for glucose monitoring  Rx pended for [place] [id number] sensors   Appt for Cgm teaching set up for 1/12/23    Pharmacist Tracking Tool  Reason For Outreach: Embedded Pharmacist  Demographics:  Intervention Method: Phone  Type of Intervention: Follow-Up  Topics Addressed: Diabetes  Pharmacologic Interventions: Medication Conversion  Non-Pharmacologic Interventions: Personal CGM  Time:  Direct Patient Care: 10 mins  Care Coordination: 10 mins  Recommendation Recipient: Provider  Outcome: Accepted

## 2023-01-10 ENCOUNTER — TELEPHONE (OUTPATIENT)
Dept: FAMILY MEDICINE CLINIC | Facility: CLINIC | Age: 43
End: 2023-01-10

## 2023-01-10 NOTE — TELEPHONE ENCOUNTER
Patient called stating that Ozempic requires prior auth  Called pharmacy x2 and confirmed correct fax number  They were able to provide me with pt's prescription insurance info to initiate the PA myself through cover my meds   (Schaeffer: Kevin Rincon)     Pharmacist Tracking Tool  Reason For Outreach: Embedded Pharmacist  Demographics:  Intervention Method: Phone  Type of Intervention: Follow-Up  Topics Addressed: Diabetes  Pharmacologic Interventions: N/A  Non-Pharmacologic Interventions: Care coordination  Time:  Direct Patient Care: 0 mins  Care Coordination: 15 mins  Recommendation Recipient: N/A  Outcome: N/A

## 2023-01-12 ENCOUNTER — CLINICAL SUPPORT (OUTPATIENT)
Dept: FAMILY MEDICINE CLINIC | Facility: CLINIC | Age: 43
End: 2023-01-12

## 2023-01-12 ENCOUNTER — PATIENT OUTREACH (OUTPATIENT)
Dept: FAMILY MEDICINE CLINIC | Facility: CLINIC | Age: 43
End: 2023-01-12

## 2023-01-12 DIAGNOSIS — E11.65 TYPE 2 DIABETES MELLITUS WITH HYPERGLYCEMIA, WITHOUT LONG-TERM CURRENT USE OF INSULIN (HCC): Primary | ICD-10-CM

## 2023-01-12 NOTE — PROGRESS NOTES
[address] [facility] Uri Pearl, PharmD, BCACP      Freestyle Serena Gores 3 CGM Training     Patient visited office today for training on the Serena Gores 3 CGM sensor  Some important things to remember regarding the Freestyle Bryce 3 system:    1) Change the sensor every 14 days  Always rotate arms  2) New sensors require a 60 minute warm up period, then you can start checking your blood sugar readings  3) Sensor must be removed for x-ray, CT, MRI, diathermy, or any radiation treatment  4) Sensor can be worn while bathing, showering, or swimming for no longer than 30 minutes and/or deeper than 3 feet  5) To receive alarms, your reader should be turned on, within 33 feet of you, and unobstructed at all times  If your reader is out of range of your sensor, you may not receive glucose alarms     If you are severely dehydrated, the results may inaccurate  Taking ascorbic acid (vitamin C) supplements while wearing the sensor may falsely raise sensor glucose readings  You can take doses of ascorbic acid up to 500 mg per day and make treatment decisions with the sensor  Taking more than 500 mg of ascorbic acid per day may affect the sensor readings which could cause you to miss a severe low glucose event  If your sensor ever falls off prior to 14 days, please call the [facility] number at [phone] for assistance  Patient did connect with office through [facility]  Pharmacist Tracking Tool  Reason For Outreach: Embedded Pharmacist  Demographics:  Intervention Method:  In Person  Type of Intervention: Follow-Up  Topics Addressed: Diabetes  Pharmacologic Interventions: N/A  Non-Pharmacologic Interventions: Disease state education, Home Monitoring, Medication/Device education and Personal CGM  Time:  Direct Patient Care: 45 mins  Care Coordination: 5 mins  Recommendation Recipient: Patient/Caregiver  Outcome: Accepted

## 2023-01-13 NOTE — PROGRESS NOTES
Patient came into the office for a visit with clinical pharmacist to start CGM  I spent the first 15 min talking with her about her diagnosis and what changes she made since we last spoke  Although the tracker showed about 2500 calories she tried to stay between 1183-0663 calories  She did not complain of headaches, dizziness or weakness during this time  I suggested she stick with 1500 calories for now since she is just starting with the program  She is not having any additional challenges at this time  I was present for the CGM appointment  We spoke again at the end about diabetic eye exams  Patient currently sees Ciro annually   Also podiatry for foot exams which she stated that she also sees a podiatrist  Next outreach 1/19

## 2023-01-19 ENCOUNTER — PATIENT OUTREACH (OUTPATIENT)
Dept: FAMILY MEDICINE CLINIC | Facility: CLINIC | Age: 43
End: 2023-01-19

## 2023-01-19 NOTE — PROGRESS NOTES
Called patient during scheduled outreach time  No answer, voicemail left with callback information  Will reach out again if no response  Patient returned call  She started the Ozempic on Sunday without difficulty  She has noticed that she has less cravings and feels less bloated, but that she thinks is more related to the drastic change she made to her diet which I agree  She asked about utilizing protein shakes as a late after noon boost to get her till dinner which I have found to be an easy and better snack for most  She has been successfully tracking, but did run into some odd nuances with the lisa that we worked through  Her BG as reported by CGM have been steady and between   She asked about the low dips overnight which are not in the danger zone but I suggested having 2 peanut butter crackers before bed to see if that makes a difference  She also asked about the ozempic based on the amount that was provided to her in the medication kit  I reviewed with her how long that will last at the different doses as well as what to do when the new box is refilled  She also mentioned about bruising around the CGM I let her know that can happen and is not harmful  Since she is not insulin depended diabetes she won't have to keep it on much longer   Next outreach 2/2

## 2023-01-24 ENCOUNTER — TELEPHONE (OUTPATIENT)
Dept: FAMILY MEDICINE CLINIC | Facility: CLINIC | Age: 43
End: 2023-01-24

## 2023-01-24 NOTE — TELEPHONE ENCOUNTER
Patient called because she accidentally wasted a 0 25 mg dose of the Ozempic pen when trying to prime the pen  This means the first pen will last her 5 weeks instead of 6 weeks  She was worried that the pharmacy will not let her fill the script early  I was able to view how the pharmacy billed the script  It was billed as a 28 day supply or 4 week supply therefore, she should have no issues with getting a refill after 5 weeks instead of 6 weeks  We also discussed that you only need to prime the pen when you first open a new pen, not with each dose       Pharmacist Tracking Tool  Reason For Outreach: Embedded Pharmacist  Demographics:  Intervention Method: Phone  Type of Intervention: Follow-Up  Topics Addressed: Diabetes  Pharmacologic Interventions: N/A  Non-Pharmacologic Interventions: Medication/Device education  Time:  Direct Patient Care: 10 mins  Care Coordination: 5 mins  Recommendation Recipient: Patient/Caregiver  Outcome: Accepted

## 2023-02-02 ENCOUNTER — PATIENT OUTREACH (OUTPATIENT)
Dept: FAMILY MEDICINE CLINIC | Facility: CLINIC | Age: 43
End: 2023-02-02

## 2023-02-02 ENCOUNTER — CLINICAL SUPPORT (OUTPATIENT)
Dept: FAMILY MEDICINE CLINIC | Facility: CLINIC | Age: 43
End: 2023-02-02

## 2023-02-02 DIAGNOSIS — E11.65 TYPE 2 DIABETES MELLITUS WITH HYPERGLYCEMIA, WITHOUT LONG-TERM CURRENT USE OF INSULIN (HCC): Primary | ICD-10-CM

## 2023-02-02 NOTE — PROGRESS NOTES
Spoke with patient during scheduled outreach  She is doing really well over all with maintianing calories between 0588-8206  She has less cravings and feels full  No side effects from medication  Could drink more water but is doing good  She drinks fairlife protein drinks which are 150 calories and only 2g carbs  She is down 13lbs and have noticed a difference in energy levels   Next outreach 2/16

## 2023-02-02 NOTE — PROGRESS NOTES
[address] Pharmacist    Anton Funes is a 38 y o  female who was referred to the pharmacist for newly diagnosed Type 2 diabetes education and management, referred by MARCY Swartz   Telemedicine consent  The patient was identified by name and date of birth  Anton Funes was informed that this is a telemedicine visit and that the visit is being conducted through Telephone  My office door was closed  No one else was in the room  She acknowledged consent and understanding of privacy and security of the video platform  The patient has agreed to participate and understands they can discontinue the visit at any time  Assessment/ Plan     1  Type 2 Diabetes:  • Goal A1c <6 5% individualized based on age and duration  Most recent A1c above at 7 2% (12/29/22)  New diagnosis of Type 2 diabetes  • Reported Home SMBG  - Within goal range 70 to 180 mg/dL 99% of time  Patient has excellent blood sugar control    - Blood sugar occasionally dips below 70 mg/dL but less than 1% of time  Patient is not symptomatic when this happens  • Complications:  o Microvascular complications: None  o Macrovascular complications: None   • Current Diabetes Regimen:  o Ozempic 0 25 mg weekly     Changes to Medication Regimen: If PCP is in agreement with plan  · Increase Ozempic to 0 5 mg weekly on 2/12/23  • Most recent labs and diabetes goals discussed   • Materials Provided: None today   • Labs Ordered: None; urine microalbumin next in office visit     2  Additional Therapies:  • Statin: no - statin therapy is indicated  Will discuss during next follow up with PCP   • ACEI/ARB: no- not indicated at this time; BP controleld   • ASA: no- not indicated for primary prevention     3  Health Maintenance:  • Eye Exam: due; next appt scheduled for July; Dr Guillermo Turcios at [address] Exam: due; nothing scheduled   • Urine Microalbumin: due    4   Medication Reconciliation:   • Medication list reviewed with pt at today's visit  • Medication list updated to reflect medications pt is currently taking     5  Hyperlipidemia without clinical ASCVD event:   2018 ACC/AHA lipid guidelines recommend moderate statin  • Patient currently on no intensity and tolerating well without side effects  • Lipid panel elevated/LFTs acceptable  6  HTN:   BP goal less than 130/80 mmHg  • In office BP below goal, HR acceptable  Monitoring: Bryce 3 sensor, a1c and urine microalbumin at 3 month follow up    Follow-up: 4 weeks      Subjective     1  DM Medication Adherence/ Tolerability:  · Ozempic 0 25 mg weekly started 2/15/23; denies N/V/D denies constipation  Tolerating well so far  Medications Tried in Past:  · Metformin 500 mg TID for PCOS  Did not tolerate- did not absorb medication  2  Lifestyle:   • Working with care management for diet and lifestyle adjustments  • Decreased appetite since starting Ozempic  • Weight on home scale 259 lbs (2/2/23)    3  Home monitoring devices  • Glucometer: No  • CGM: Yes; Freestyle Bryce 3  • BP monitor: Unknown - in office BP controlled     Objective       Current Outpatient Medications   Medication Instructions   • ascorbic acid (VITAMIN C) 500 mg, Oral   • Blood Glucose Monitoring Suppl (OneTouch Verio Reflect) w/Device KIT Check blood sugars once daily  Please substitute with appropriate alternative as covered by patient's insurance  Dx: E11 65   • Continuous Blood Gluc Sensor (FreeStyle Bryce 3 Sensor) MISC 1 each, Does not apply, Every 14 days   • glucose blood (OneTouch Verio) test strip Check blood sugars once daily  Please substitute with appropriate alternative as covered by patient's insurance  Dx: E11 65   • Junel FE 1/20 1-20 MG-MCG per tablet 1 tablet, Oral, Daily   • loratadine (CLARITIN) 10 mg, Oral, Daily   • OneTouch Delica Lancets 68I MISC Check blood sugars once daily   Please substitute with appropriate alternative as covered by patient's insurance  Dx: E11 65   • Semaglutide,0 25 or 0 5MG/DOS, (Ozempic, 0 25 or 0 5 MG/DOSE,) 2 MG/1 5ML SOPN Inject 0 25 mg under the skin once a week for 28 days, THEN 0 5 mg once a week for 28 days  No Known Allergies    Immunization History   Administered Date(s) Administered   • MMR 04/17/2013   • Tdap 04/17/2013     I have reviewed the patient's allergies, medications and history as noted in the electronic medical record and updated as necessary  Lab Results   Component Value Date    HGBA1C 7 2 (A) 12/29/2022    HGBA1C 6 3 (H) 04/02/2021       Blood Glucose Readings                  ASCVD Risk:  The 10-year ASCVD risk score (Hannah HICKS, et al , 2019) is: 0 5%    Values used to calculate the score:      Age: 38 years      Sex: Female      Is Non- : No      Diabetic: Yes      Tobacco smoker: No      Systolic Blood Pressure: 96 mmHg      Is BP treated: No      HDL Cholesterol: 86 mg/dL      Total Cholesterol: 230 mg/dL     Vitals: There were no vitals filed for this visit      Labs:    Lab Results   Component Value Date    SODIUM 137 12/29/2022    K 4 1 12/29/2022    EGFR 111 12/29/2022    CREATININE 0 65 12/29/2022     Pharmacist Tracking Tool     Pharmacist Tracking Tool  Reason For Outreach: Embedded Pharmacist  Demographics:  Intervention Method: Phone  Type of Intervention: Follow-Up  Topics Addressed: Diabetes  Pharmacologic Interventions: Dose or Frequency Adjusted  Non-Pharmacologic Interventions: Disease state education and Medication/Device education  Time:  Direct Patient Care: 15 mins  Care Coordination: 15 mins  Recommendation Recipient: Patient/Caregiver and Provider  Outcome: Accepted

## 2023-02-08 ENCOUNTER — OFFICE VISIT (OUTPATIENT)
Dept: FAMILY MEDICINE CLINIC | Facility: CLINIC | Age: 43
End: 2023-02-08

## 2023-02-08 VITALS
BODY MASS INDEX: 48.9 KG/M2 | DIASTOLIC BLOOD PRESSURE: 64 MMHG | HEIGHT: 61 IN | TEMPERATURE: 98.2 F | SYSTOLIC BLOOD PRESSURE: 98 MMHG | WEIGHT: 259 LBS | HEART RATE: 86 BPM | OXYGEN SATURATION: 97 %

## 2023-02-08 DIAGNOSIS — R09.81 CONGESTION OF NASAL SINUS: ICD-10-CM

## 2023-02-08 DIAGNOSIS — R09.82 POSTNASAL DRIP: ICD-10-CM

## 2023-02-08 DIAGNOSIS — J01.90 ACUTE SINUSITIS, RECURRENCE NOT SPECIFIED, UNSPECIFIED LOCATION: Primary | ICD-10-CM

## 2023-02-08 RX ORDER — AZITHROMYCIN 250 MG/1
TABLET, FILM COATED ORAL
Qty: 6 TABLET | Refills: 0 | Status: SHIPPED | OUTPATIENT
Start: 2023-02-08 | End: 2023-02-13

## 2023-02-08 RX ORDER — AZITHROMYCIN 250 MG/1
TABLET, FILM COATED ORAL
Qty: 6 TABLET | Refills: 0
Start: 2023-02-08 | End: 2023-02-08

## 2023-02-08 NOTE — PROGRESS NOTES
Assessment/Plan:    No problem-specific Assessment & Plan notes found for this encounter  Diagnoses and all orders for this visit:    Acute sinusitis, recurrence not specified, unspecified location  -     Discontinue: azithromycin (Zithromax) 250 mg tablet; Take 2 tablets (500 mg total) by mouth daily for 1 day, THEN 1 tablet (250 mg total) daily for 4 days  -     azithromycin (Zithromax) 250 mg tablet; Take 2 tablets (500 mg total) by mouth daily for 1 day, THEN 1 tablet (250 mg total) daily for 4 days  Congestion of nasal sinus    Postnasal drip          Patient is a 25-year-old female with symptoms consistent with a acute sinusitis  I will place her on Zithromax Z-Abel  I recommended to continue Mucinex at home and may use over-the-counter Claritin or Zyrtec in combination to help control drainage and pressure  She should take the antibiotic with food and continue to hydrate frequently  She did have a negative COVID test at home yesterday and has been on day 14 of symptoms  Recommended follow-up in office if not improved after treatment course  Recommend supportive care with fluids, rest, flonase 1-2 sprays each nostril, otc claritin or zyrtec daily and mucinex as directed  Tylenol recommended prn for pain or fever  Instructed pt RTO if symptoms persist or worsen over the course of the next week    20 minutes spent on examination, charting, plan of care  This note was dictated using software  Subjective:      Patient ID: Tyler Dunn is a 38 y o  female  HPI    The following portions of the patient's history were reviewed and updated as appropriate: allergies, current medications, past family history, past medical history, past social history, past surgical history and problem list     Sinus Pain  Patient complains of congestion, facial pain, nasal congestion, sinus pressure and sore throat  Onset of symptoms was 2 weeks ago  Symptoms have been gradually worsening since that time  She is drinking plenty of fluids  Past history is significant for nothing  Patient is non-smoker  Review of Systems   Constitutional: Negative for fever  HENT: Positive for congestion, postnasal drip, rhinorrhea, sinus pressure, sinus pain and sore throat  Negative for ear pain  Respiratory: Positive for cough  Negative for shortness of breath  Cardiovascular: Negative for chest pain  Gastrointestinal: Negative for abdominal pain, constipation, diarrhea, nausea and vomiting  Skin: Negative for rash  Objective:      BP 98/64   Pulse 86   Temp 98 2 °F (36 8 °C)   Ht 5' 1" (1 549 m)   Wt 117 kg (259 lb)   SpO2 97%   BMI 48 94 kg/m²          Physical Exam  Vitals and nursing note reviewed  Constitutional:       General: She is not in acute distress  Appearance: Normal appearance  HENT:      Head: Normocephalic and atraumatic  Right Ear: Tympanic membrane, ear canal and external ear normal  There is no impacted cerumen  Left Ear: Tympanic membrane, ear canal and external ear normal  There is no impacted cerumen  Nose: Congestion and rhinorrhea present  Mouth/Throat:      Mouth: Mucous membranes are moist       Pharynx: Oropharynx is clear  No oropharyngeal exudate or posterior oropharyngeal erythema  Comments: Postnasal drip  Eyes:      Extraocular Movements: Extraocular movements intact  Conjunctiva/sclera: Conjunctivae normal       Pupils: Pupils are equal, round, and reactive to light  Cardiovascular:      Rate and Rhythm: Normal rate and regular rhythm  Heart sounds: Normal heart sounds  No murmur heard  No friction rub  No gallop  Pulmonary:      Effort: Pulmonary effort is normal  No respiratory distress  Breath sounds: Normal breath sounds  No wheezing  Musculoskeletal:         General: Normal range of motion  Cervical back: Normal range of motion and neck supple  Lymphadenopathy:      Cervical: No cervical adenopathy  Skin:     General: Skin is warm and dry  Findings: No erythema or rash  Neurological:      General: No focal deficit present  Mental Status: She is alert and oriented to person, place, and time  Mental status is at baseline  Psychiatric:         Mood and Affect: Mood normal          Behavior: Behavior normal          Thought Content:  Thought content normal          Judgment: Judgment normal

## 2023-02-16 ENCOUNTER — PATIENT OUTREACH (OUTPATIENT)
Dept: FAMILY MEDICINE CLINIC | Facility: CLINIC | Age: 43
End: 2023-02-16

## 2023-02-16 NOTE — PROGRESS NOTES
Called patient during scheduled outreach time  No answer, voicemail left with callback information  Will reach out again if no response  Patient return my call she is doing well  Her sinus infection has resolved  She has noticed since increasing her dose of ozempic that  She can not tolerate certain foods such as onions  She is not tracking since her appetite has decreased, but I recommended spot checking so that she doesn't fall too far below 1200 calories  She has not replaced her CGM since 2/4, but was in the target zone 98% of the time so she really doesn't need it   Next outreach 3/16

## 2023-03-02 ENCOUNTER — CLINICAL SUPPORT (OUTPATIENT)
Dept: FAMILY MEDICINE CLINIC | Facility: CLINIC | Age: 43
End: 2023-03-02

## 2023-03-02 DIAGNOSIS — E11.65 TYPE 2 DIABETES MELLITUS WITH HYPERGLYCEMIA, WITHOUT LONG-TERM CURRENT USE OF INSULIN (HCC): Primary | ICD-10-CM

## 2023-03-02 NOTE — PROGRESS NOTES
[address]    Mele Saravia is a 37 y o  female who was referred to the pharmacist for newly diagnosed Type 2 diabetes education and management, referred by MARCY Muller   Telemedicine consent  The patient was identified by name and date of birth  Mele Saravia was informed that this is a telemedicine visit and that the visit is being conducted through Telephone  My office door was closed  No one else was in the room  She acknowledged consent and understanding of privacy and security of the video platform  The patient has agreed to participate and understands they can discontinue the visit at any time  Assessment/ Plan     1  Type 2 Diabetes:  • Goal A1c <6 5% individualized based on age and duration  Most recent A1c above at 7 2% (12/29/22)  New diagnosis of Type 2 diabetes  • No longer tracking using Bryce 3  Blood sugars have been controlled  • Complications:  o Microvascular complications: None  o Macrovascular complications: None   • Current Diabetes Regimen:  o Ozempic 0 5 mg weekly     Changes to Medication Regimen: If PCP is in agreement with plan  · Finish up current supply of Ozempic 0 5 mg weekly (2 weeks left in current pen) then increase Ozempic to 1 mg weekly  Rx pended for updated dose     • Most recent labs and diabetes goals discussed   • Materials Provided: None today   • Labs Ordered: None; urine microalbumin next in office visit     2  Additional Therapies:  • Statin: no - statin therapy is indicated  Discuss at next OV in 4 weeks  • ACEI/ARB: no- not indicated at this time; BP controleld   • ASA: no- not indicated for primary prevention     3  Health Maintenance:  • Eye Exam: due; next appt scheduled for July; Dr Mandi Burks at [address] Exam: due; nothing scheduled   • Urine Microalbumin: due    4   Medication Reconciliation:   • Medication list reviewed with pt at today's visit   • Medication list updated to reflect medications pt is currently taking     5  Hyperlipidemia without clinical ASCVD event:   2018 ACC/AHA lipid guidelines recommend moderate statin  • Patient currently not on statin  • Lipid panel elevated/LFTs acceptable  6  HTN:   BP goal less than 130/80 mmHg  • In office BP below goal, HR acceptable  Monitoring: a1c and urine microalbumin at 3 month follow up    Follow-up: 4 weeks      Subjective     1  DM Medication Adherence/ Tolerability:  · Ozempic 0 5 mg weekly started increased on 2/12/23  Should have 2 doses of 0 5 mg left in pen  Denies N/V/D denies constipation  Tolerating well so far  Medications Tried in Past:  · Metformin 500 mg TID for PCOS  Did not tolerate- did not absorb medication  2  Lifestyle:   • Working with care management for diet and lifestyle adjustments  • Decreased appetite since starting Ozempic  • Weight on home scale 253 lbs (3/2/23)    3  Home monitoring devices  • Glucometer: No  • CGM: Yes; Freestyle Bryce 3  • BP monitor: Unknown - in office BP controlled     Objective       Current Outpatient Medications   Medication Instructions   • ascorbic acid (VITAMIN C) 500 mg, Oral   • Junel FE 1/20 1-20 MG-MCG per tablet 1 tablet, Oral, Daily   • loratadine (CLARITIN) 10 mg, Oral, Daily   • Semaglutide,0 25 or 0 5MG/DOS, (Ozempic, 0 25 or 0 5 MG/DOSE,) 2 MG/1 5ML SOPN Inject 0 25 mg under the skin once a week for 28 days, THEN 0 5 mg once a week for 28 days  No Known Allergies    Immunization History   Administered Date(s) Administered   • MMR 04/17/2013   • Tdap 04/17/2013     I have reviewed the patient's allergies, medications and history as noted in the electronic medical record and updated as necessary      Lab Results   Component Value Date    HGBA1C 7 2 (A) 12/29/2022    HGBA1C 6 3 (H) 04/02/2021         ASCVD Risk:  The 10-year ASCVD risk score (Hannah HICKS, et al , 2019) is: 0 6%    Values used to calculate the score:      Age: 37 years      Sex: Female      Is Non- : No      Diabetic: Yes      Tobacco smoker: No      Systolic Blood Pressure: 98 mmHg      Is BP treated: No      HDL Cholesterol: 86 mg/dL      Total Cholesterol: 230 mg/dL     Vitals: There were no vitals filed for this visit      Weight on Home scale:   · 3/2/23: 253 lbs    Labs:    Lab Results   Component Value Date    SODIUM 137 12/29/2022    K 4 1 12/29/2022    EGFR 111 12/29/2022    CREATININE 0 65 12/29/2022     Pharmacist Tracking Tool     Pharmacist Tracking Tool  Reason For Outreach: Embedded Pharmacist  Demographics:  Intervention Method: Phone  Type of Intervention: Follow-Up  Topics Addressed: Diabetes  Pharmacologic Interventions: Dose or Frequency Adjusted  Non-Pharmacologic Interventions: Disease state education and Medication/Device education  Time:  Direct Patient Care: 15 mins  Care Coordination: 15 mins  Recommendation Recipient: Patient/Caregiver and Provider  Outcome: Accepted

## 2023-03-10 ENCOUNTER — TELEPHONE (OUTPATIENT)
Dept: ADMINISTRATIVE | Facility: OTHER | Age: 43
End: 2023-03-10

## 2023-03-10 NOTE — TELEPHONE ENCOUNTER
----- Message from Cynthia Garcia sent at 3/9/2023  1:58 PM EST -----  Regarding: Care Gap request  03/09/23 1:58 PM    Hello, our patient attached above has had Diabetic Eye Exam completed/performed  Please assist in updating the patient chart by pulling the document from the Media Tab  The date of service is 2022       Thank you,  Elise Smith

## 2023-03-10 NOTE — TELEPHONE ENCOUNTER
Upon review of the In Basket request we were able to locate, review, and update the patient chart as requested for Diabetic Eye Exam     Any additional questions or concerns should be emailed to the Practice Liaisons via the appropriate education email address, please do not reply via In Basket      Thank you  Andreas Oshea

## 2023-03-16 ENCOUNTER — RA CDI HCC (OUTPATIENT)
Dept: OTHER | Facility: HOSPITAL | Age: 43
End: 2023-03-16

## 2023-03-16 ENCOUNTER — PATIENT OUTREACH (OUTPATIENT)
Dept: FAMILY MEDICINE CLINIC | Facility: CLINIC | Age: 43
End: 2023-03-16

## 2023-03-16 NOTE — PROGRESS NOTES
[facility] 75  coding opportunities       Chart reviewed, no opportunity found: CHART REVIEWED, NO OPPORTUNITY FOUND        Patients Insurance        Commercial Insurance: [address] [place] [address]

## 2023-03-28 PROBLEM — E78.00 ELEVATED LDL CHOLESTEROL LEVEL: Status: ACTIVE | Noted: 2023-03-28

## 2023-03-28 NOTE — PATIENT INSTRUCTIONS
Foot Care for People with Diabetes   AMBULATORY CARE:   What you need to know about foot care:  Long-term high blood sugar levels can damage the blood vessels and nerves in your legs and feet  This damage makes it hard to feel pressure, pain, temperature, and touch  You may not be able to feel a cut or sore, or shoes that are too tight  Foot care is needed to prevent serious problems, such as an infection or amputation  Diabetes may cause your toes to become crooked or curved under  These changes may affect the way you walk and can lead to increased pressure on your foot  The pressure can decrease blood flow to your feet  Lack of blood flow increases your risk for a foot ulcer  Call your care team provider if:   Your feet become numb, weak, or hard to move  You have pus draining from a sore on your foot  You have a wound on your foot that gets bigger, deeper, or does not heal     You see blisters, cuts, scratches, calluses, or sores on your foot  You have a fever, and your feet become red, warm, and swollen  Your toenails become thick, curled, or yellow  You find it hard to check your feet because your vision is poor  You have questions or concerns about your condition or care  How to care for your feet:   Check your feet each day  Look at your whole foot, including the bottom, and between and under your toes  Check for wounds, corns, and calluses  Use a mirror to see the bottom of your feet  The skin on your feet may be shiny, tight, or darker than normal  Your feet may also be cold and pale  Feel your feet by running your hands along the tops, bottoms, sides, and between your toes  Redness, swelling, and warmth are signs of blood flow problems that can lead to a foot ulcer  Do not try to remove corns or calluses yourself  Do not ignore small problems, such as dry skin or small wounds  These can become life-threatening over time without proper care           Wash your feet each day with soap and warm water  Do not use hot water, because this can injure your foot  Dry your feet gently with a towel after you wash them  Dry between and under your toes  Apply lotion or a moisturizer on your dry feet  Ask your care team provider what lotions are best to use  Do not put lotion or moisturizer between your toes  Moisture between your toes could lead to skin breakdown  Cut your toenails correctly  File or cut your toenails straight across  Use a soft brush to clean around your toenails  If your toenails are very thick, you may need to have a care team provider or specialist cut them  Protect your feet  Do not walk barefoot or wear your shoes without socks  Check your shoes for rocks or other objects that can hurt your feet  Wear cotton socks to help keep your feet dry  Wear socks without toe seams, or wear them with the seams inside out  Change your socks each day  Do not wear socks that are dirty or damp  Wear shoes that fit well  Wear shoes that do not rub against any area of your feet  Your shoes should be ½ to ¾ inch (1 to 2 centimeters) longer than your feet  Your shoes should also have extra space around the widest part of your feet  Walking or athletic shoes with laces or straps that adjust are best  Ask your care team provider for help to choose shoes that fit you best  Ask your provider if you need to wear an insert, orthotic, or bandage on your feet  Go to your follow-up visits  Your care team provider will do a foot exam at least 1 time each year  You may need a foot exam more often if you have nerve damage, foot deformities, or ulcers  Your provider will check for nerve damage and how well you can feel your feet  Your provider will check your shoes to see if they fit well  Do not smoke  Smoking can damage your blood vessels and put you at increased risk for foot ulcers  Ask your care team provider for information if you currently smoke and need help to quit  E-cigarettes or smokeless tobacco still contain nicotine  Talk to your care team provider before you use these products  Follow up with your diabetes care team provider or foot specialist as directed: You will need to have your feet checked at least 1 time each year  You may need a foot exam more often if you have nerve damage, foot deformities, or ulcers  Write down your questions so you remember to ask them during your visits  © Copyright [employer] 2022 Information is for End User's use only and may not be sold, redistributed or otherwise used for commercial purposes  The above information is an  only  It is not intended as medical advice for individual conditions or treatments  Talk to your doctor, nurse or pharmacist before following any medical regimen to see if it is safe and effective for you

## 2023-03-28 NOTE — PROGRESS NOTES
"Name: Randi Calzada      : 1980      MRN: [id number]  Encounter Provider: MARCY Howell  Encounter Date: 3/30/2023   Encounter department: [facility]  IN PARTNERSHIP WITH [facility]    Assessment & Plan       1  Type 2 diabetes mellitus with hyperglycemia, without long-term current use of insulin (Formerly [facility])  Assessment & Plan:  Hemoglobin A1c much improved today at 5 5  Diabetic foot exam WNL  Microalbumin/creatinine ratio urine sample collected  Encouraged patient to continue working with care management and clinical pharmacy services  We will follow-up with the patient in 3 months to reassess A1c  Lab Results   Component Value Date    HGBA1C 5 5 2023       Orders:  -     Microalbumin, Random Urine (W/Creatinine) ([facility]); Future  -     POCT hemoglobin A1c  -     Lipid panel; Future    2  Elevated LDL cholesterol level  Assessment & Plan:  Repeat lipid panel ordered for patient to complete 1 week prior to her next follow-up  Orders:  -     Lipid panel; Future    3  Seasonal allergies  Assessment & Plan:  Reporting \"really bad allergies this year    my sinuses are really bad  \"  Patient is currently taking loratadine 10 mg daily  Advised patient to introduce daily OTC Flonase into her regimen  Per patient request, a referral was placed to an allergy specialist in case the loratadine/Flonase combination is ineffective  Orders:  -     Ambulatory Referral to Allergy; Future      3 month f/u with lipid panel draw 1 week prior    Patient reports that her mammograms are typically ordered by her gynecologist, declined a mammogram ordered today         Subjective      3 MONTH DIABETES FOLLOW-UP    Review of Systems   Constitutional: Negative  HENT: Negative  Eyes: Negative  Respiratory: Negative  Cardiovascular: Negative  Gastrointestinal: Negative  Negative for abdominal pain  Endocrine: Negative  Genitourinary: Negative    " "  Musculoskeletal: Negative  Skin: Negative  Allergic/Immunologic: Negative  Neurological: Negative  Negative for numbness  Hematological: Negative  Psychiatric/Behavioral: Negative  Current Outpatient Medications on File Prior to Visit   Medication Sig   • ascorbic acid (VITAMIN C) 500 MG tablet Take 500 mg by mouth   • Junel FE 1/20 1-20 MG-MCG per tablet Take 1 tablet by mouth daily   • loratadine (CLARITIN) 10 mg tablet Take 10 mg by mouth daily   • semaglutide, 1 mg/dose, (Ozempic) 4 mg/3 mL injection pen Inject 0 75 mL (1 mg total) under the skin once a week       Objective     /65   Pulse 79   Temp 97 6 °F (36 4 °C)   Ht 5' 1\" (1 549 m)   Wt 114 kg (252 lb 3 2 oz)   SpO2 97%   BMI 47 65 kg/m²     Physical Exam  Constitutional:       General: She is not in acute distress  Appearance: Normal appearance  She is obese  HENT:      Nose: Nose normal    Eyes:      Extraocular Movements: Extraocular movements intact  Conjunctiva/sclera: Conjunctivae normal    Cardiovascular:      Pulses: no weak pulses          Dorsalis pedis pulses are 2+ on the right side and 2+ on the left side  Posterior tibial pulses are 2+ on the right side and 2+ on the left side  Pulmonary:      Effort: Pulmonary effort is normal  No respiratory distress  Breath sounds: No wheezing  Abdominal:      General: Abdomen is flat  Musculoskeletal:         General: No swelling or deformity  Normal range of motion  Cervical back: Normal range of motion  Feet:      Right foot:      Skin integrity: No ulcer, skin breakdown, erythema, warmth, callus or dry skin  Left foot:      Skin integrity: No ulcer, skin breakdown, erythema, warmth, callus or dry skin  Skin:     Findings: No erythema or rash  Neurological:      Mental Status: She is alert and oriented to person, place, and time     Psychiatric:         Mood and Affect: Mood normal          Behavior: Behavior normal  " Patient's shoes and socks removed  Right Foot/Ankle   Right Foot Inspection  Skin Exam: skin normal and skin intact  No dry skin, no warmth, no callus, no erythema, no maceration, no abnormal color, no pre-ulcer, no ulcer and no callus  Toe Exam: ROM and strength within normal limits  Sensory   Monofilament testing: intact    Vascular  The right DP pulse is 2+  The right PT pulse is 2+  Left Foot/Ankle  Left Foot Inspection  Skin Exam: skin normal and skin intact  No dry skin, no warmth, no erythema, no maceration, normal color, no pre-ulcer, no ulcer and no callus  Toe Exam: ROM and strength within normal limits  Sensory   Monofilament testing: intact    Vascular  The left DP pulse is 2+  The left PT pulse is 2+       Assign Risk Category  No deformity present  No loss of protective sensation  No weak pulses  Risk: 0      MARCY Powell

## 2023-03-30 ENCOUNTER — PATIENT OUTREACH (OUTPATIENT)
Dept: FAMILY MEDICINE CLINIC | Facility: CLINIC | Age: 43
End: 2023-03-30

## 2023-03-30 ENCOUNTER — CLINICAL SUPPORT (OUTPATIENT)
Dept: FAMILY MEDICINE CLINIC | Facility: CLINIC | Age: 43
End: 2023-03-30

## 2023-03-30 ENCOUNTER — OFFICE VISIT (OUTPATIENT)
Dept: FAMILY MEDICINE CLINIC | Facility: CLINIC | Age: 43
End: 2023-03-30

## 2023-03-30 VITALS
BODY MASS INDEX: 47.62 KG/M2 | WEIGHT: 252.2 LBS | TEMPERATURE: 97.6 F | HEART RATE: 79 BPM | OXYGEN SATURATION: 97 % | HEIGHT: 61 IN | DIASTOLIC BLOOD PRESSURE: 65 MMHG | SYSTOLIC BLOOD PRESSURE: 130 MMHG

## 2023-03-30 DIAGNOSIS — E78.00 ELEVATED LDL CHOLESTEROL LEVEL: ICD-10-CM

## 2023-03-30 DIAGNOSIS — E11.65 TYPE 2 DIABETES MELLITUS WITH HYPERGLYCEMIA, WITHOUT LONG-TERM CURRENT USE OF INSULIN (HCC): Primary | ICD-10-CM

## 2023-03-30 DIAGNOSIS — E11.9 TYPE 2 DIABETES MELLITUS WITHOUT COMPLICATION, WITHOUT LONG-TERM CURRENT USE OF INSULIN (HCC): Primary | ICD-10-CM

## 2023-03-30 DIAGNOSIS — J30.2 SEASONAL ALLERGIES: ICD-10-CM

## 2023-03-30 LAB — SL AMB POCT HEMOGLOBIN AIC: 5.5 (ref ?–6.5)

## 2023-03-30 NOTE — PROGRESS NOTES
[address] Pharmacist    Ora Deal is a 37 y o  female who was referred to the pharmacist for newly diagnosed Type 2 diabetes education and management, referred by MARCY Hall   Telemedicine consent  The patient was identified by name and date of birth  Ora Deal was informed that this is a telemedicine visit and that the visit is being conducted through Telephone  My office door was closed  No one else was in the room  She acknowledged consent and understanding of privacy and security of the video platform  The patient has agreed to participate and understands they can discontinue the visit at any time  Assessment/ Plan     1  Type 2 Diabetes:  • Goal A1c <6 5% individualized based on age and duration  Most recent A1c below at 5 5%  • No longer tracking using Bryce 3  Blood sugars have been controlled  • Complications:  o Microvascular complications: None  o Macrovascular complications: None   • Current Diabetes Regimen:  o Ozempic 1 mg weekly     Changes to Medication Regimen: If PCP is in agreement with plan  · Finish up current supply of Ozempic 1 mg weekly then increase to 2 mg weekly  Rx pended for dose increase    • Most recent labs and diabetes goals discussed   • Materials Provided: None today   • Labs Ordered: None; urine microalbumin next in office visit     2  Additional Therapies:  • Statin: no - statin therapy is indicated  • ACEI/ARB: no- not indicated at this time; BP controleld   • ASA: no- not indicated for primary prevention     3  Health Maintenance:  • Eye Exam: up to date  • Foot Exam: up to date  • Urine Microalbumin: up to date    4  Medication Reconciliation:   • Medication list reviewed with pt at today's visit  • Medication list updated to reflect medications pt is currently taking     5  Hyperlipidemia without clinical ASCVD event:   2018 ACC/AHA lipid guidelines recommend moderate statin  • Patient currently not on statin  • Lipid panel elevated/LFTs acceptable  6  HTN:   BP goal less than 130/80 mmHg  • In office BP below goal, HR acceptable  Follow-up: as needed with clinical pharmacist     Subjective     1  DM Medication Adherence/ Tolerability:  · Ozempic 1 mg weekly-  Denies N/V/D denies constipation  Tolerating well so far  Medications Tried in Past:  · Metformin 500 mg TID for PCOS  Did not tolerate- did not absorb medication  2  Lifestyle:   • Working with care management for diet and lifestyle adjustments  • Decreased appetite since starting Ozempic    3  Home monitoring devices  • Glucometer: No  • CGM: Yes; Freestyle Bryce 3  • BP monitor: Unknown - in office BP controlled     Objective       Current Outpatient Medications   Medication Instructions   • ascorbic acid (VITAMIN C) 500 mg, Oral   • Junel FE 1/20 1-20 MG-MCG per tablet 1 tablet, Oral, Daily   • loratadine (CLARITIN) 10 mg, Oral, Daily   • semaglutide (1 mg/dose) (OZEMPIC) 1 mg, Subcutaneous, Weekly     No Known Allergies    Immunization History   Administered Date(s) Administered   • MMR 04/17/2013   • Tdap 04/17/2013     I have reviewed the patient's allergies, medications and history as noted in the electronic medical record and updated as necessary  Lab Results   Component Value Date    HGBA1C 5 5 03/30/2023    HGBA1C 7 2 (A) 12/29/2022    HGBA1C 6 3 (H) 04/02/2021         ASCVD Risk:  The 10-year ASCVD risk score (Hannah HICKS, et al , 2019) is: 1%    Values used to calculate the score:      Age: 37 years      Sex: Female      Is Non- : No      Diabetic: Yes      Tobacco smoker: No      Systolic Blood Pressure: 097 mmHg      Is BP treated: No      HDL Cholesterol: 86 mg/dL      Total Cholesterol: 230 mg/dL     Vitals: There were no vitals filed for this visit      Weight on Home scale:   · 3/2/23: 253 lbs    Labs:    Lab Results   Component Value Date    SODIUM 137 12/29/2022 K 4 1 12/29/2022    EGFR 111 12/29/2022    CREATININE 0 65 12/29/2022     Pharmacist Tracking Tool     Pharmacist Tracking Tool  Reason For Outreach: Embedded Pharmacist  Demographics:  Intervention Method:  In Person  Type of Intervention: Follow-Up  Topics Addressed: Diabetes  Pharmacologic Interventions: Dose or Frequency Adjusted  Non-Pharmacologic Interventions: Disease state education and Medication/Device education  Time:  Direct Patient Care: 15 mins  Care Coordination: 15 mins  Recommendation Recipient: Patient/Caregiver and Provider  Outcome: Accepted

## 2023-03-31 NOTE — PROGRESS NOTES
Patient was seen in office for a follow up with the provider  Both the clinical pharmacist and myself met with her following provider visit  She is doing really well  Her A1c was 5 5 on 1mg of ozempic and per office scale she is down another 7lbs  She has not had any adverse effects from the medication so she will be stepping up to the 2mg dose  She is not always getting her water in  I explained the importance of being hydrated on this medication  She has changed her eating habits, but doesn't feel hungry  I suggested she spot check to make sure she's eating enough  Next phone outreach 2 weeks

## 2023-04-01 LAB
ALBUMIN/CREAT UR: 112 MCG/MG CREAT
CREAT UR-MCNC: 113 MG/DL (ref 20–275)
MICROALBUMIN UR-MCNC: 12.6 MG/DL

## 2023-04-04 DIAGNOSIS — R80.9 ALBUMINURIA: Primary | ICD-10-CM

## 2023-04-04 RX ORDER — LISINOPRIL 5 MG/1
5 TABLET ORAL DAILY
Qty: 30 TABLET | Refills: 3 | Status: SHIPPED | OUTPATIENT
Start: 2023-04-04

## 2023-05-31 PROBLEM — J31.0 PERENNIAL NON-ALLERGIC RHINITIS: Status: ACTIVE | Noted: 2023-05-31

## 2023-06-05 ENCOUNTER — PATIENT OUTREACH (OUTPATIENT)
Age: 43
End: 2023-06-05

## 2023-06-05 NOTE — PROGRESS NOTES
Send patient a [employer] message to check in with how she is doing  I have not seen an updated weight since 5/3 and patient is no longer using natacha 3 device  I asked her to respond or schedule an outreach call with me

## 2023-06-16 ENCOUNTER — TELEPHONE (OUTPATIENT)
Dept: FAMILY MEDICINE CLINIC | Facility: CLINIC | Age: 43
End: 2023-06-16

## 2023-06-16 NOTE — TELEPHONE ENCOUNTER
Patient called and expressed having loose stool since this past Monday, accompanied with gas, burping and discomfort  No upset belly, no fevers  She is looking to be advised  She is questioning if she should take her injection on Sunday as scheduled? She is aware we are closed this weekend and that I am routing the message to provider  I told her that someone who is on call over the weekend will address her issue  I also advised her to go to Urgent care over the weekend if her symptoms become too uncomfortable  I'm marking this high priority because it's the weekend and I know she's expecting to hear from a provider  Please advise

## 2023-06-19 ENCOUNTER — OFFICE VISIT (OUTPATIENT)
Dept: FAMILY MEDICINE CLINIC | Facility: CLINIC | Age: 43
End: 2023-06-19

## 2023-06-19 ENCOUNTER — PATIENT OUTREACH (OUTPATIENT)
Age: 43
End: 2023-06-19

## 2023-06-19 VITALS
OXYGEN SATURATION: 98 % | BODY MASS INDEX: 44.52 KG/M2 | DIASTOLIC BLOOD PRESSURE: 62 MMHG | SYSTOLIC BLOOD PRESSURE: 128 MMHG | HEART RATE: 62 BPM | HEIGHT: 61 IN | TEMPERATURE: 97.9 F | WEIGHT: 235.8 LBS

## 2023-06-19 DIAGNOSIS — T88.7XXA SIDE EFFECT OF DRUG: ICD-10-CM

## 2023-06-19 DIAGNOSIS — E11.9 TYPE 2 DIABETES MELLITUS WITHOUT COMPLICATION, WITHOUT LONG-TERM CURRENT USE OF INSULIN (HCC): Primary | ICD-10-CM

## 2023-06-19 PROCEDURE — 99213 OFFICE O/P EST LOW 20 MIN: CPT | Performed by: NURSE PRACTITIONER

## 2023-06-19 NOTE — PROGRESS NOTES
Name: Fuad Land      : 1980      MRN: [id number]  Encounter Provider: MARCY Hartley  Encounter Date: 2023   Encounter department: [id number] [place] [address] Road     1  Type 2 diabetes mellitus without complication, without long-term current use of insulin ([facility] )  Assessment & Plan:  Patient here to follow-up on diarrhea and gas pains  Patient called on Friday reporting she was having side effects of Ozempic  Patient was told to take Imodium and Pepcid for symptoms, patient no longer taking Imodium or Pepcid  Patient reports she vomited once on Friday, and symptoms resolved  Patient was taking the 2 mg of Ozempic for 2 months, but reports symptoms were very consistent with when she first started Ozempic 2 mg  Patient has been tolerating food and drinks for the last 2 days  Patient denies symptoms for the last 2 days  A1c at last visit was 5 5  Will back down Ozempic to 1 mg at this time  Patient in agreement with plan and verbalized understanding  Lab Results   Component Value Date    HGBA1C 5 5 2023       Orders:  -     semaglutide, 1 mg/dose, (Ozempic) 4 mg/3 mL injection pen; Inject 0 75 mL (1 mg total) under the skin once a week    2  Side effect of drug  Assessment & Plan:  Patient here to follow-up on diarrhea and gas pains  Patient called on Friday reporting she was having side effects of Ozempic  Patient was told to take Imodium and Pepcid for symptoms, patient no longer taking Imodium or Pepcid  Patient reports she vomited once on Friday, and symptoms resolved  Patient was taking the 2 mg of Ozempic for 2 months, but reports symptoms were very consistent with when she first started Ozempic 2 mg  Patient has been tolerating food and drinks for the last 2 days  Patient denies symptoms for the last 2 days  A1c at last visit was 5 5  Will back down Ozempic to 1 mg at this time    Patient "in agreement with plan and verbalized understanding  Subjective      Patient called office on Friday reporting diarrhea and nausea for 5 days  Patient was instructed to take Imodium for diarrhea, and Pepcid for gas and nausea  Patient reports she took Pepcid once and Imodium once, reporting Imodium did control diarrhea  Patient reports 1 episode of vomiting on Friday night, and then states her symptoms resolved  Patient has not taken Imodium or Pepcid since Friday  Patient has not experienced diarrhea, gas pains, or vomiting since Friday  Patient is tolerating food and drinks well  Patient reports symptoms felt similar to when she for started Ozempic  Patient denies any stomach pain or GI concerns at this time  Patient denies fevers or body aches  Patient reports she has been taking Ozempic 2 mg for 2 months now  Review of Systems   Constitutional: Negative  HENT: Negative  Eyes: Negative  Respiratory: Negative  Cardiovascular: Negative  Gastrointestinal: Negative  Musculoskeletal: Negative  Skin: Negative  Neurological: Negative  Current Outpatient Medications on File Prior to Visit   Medication Sig   • ascorbic acid (VITAMIN C) 500 MG tablet Take 500 mg by mouth   • Junel FE 1/20 1-20 MG-MCG per tablet Take 1 tablet by mouth daily   • lisinopril (ZESTRIL) 5 mg tablet Take 1 tablet (5 mg total) by mouth daily   • loratadine (CLARITIN) 10 mg tablet Take 10 mg by mouth daily   • [DISCONTINUED] semaglutide, 2 mg/dose, (Ozempic) 8 mg/ mL injection pen Inject 0 75 mL (2 mg total) under the skin every 7 days       Objective     /62 (BP Location: Left arm, Patient Position: Sitting, Cuff Size: Large)   Pulse 62   Temp 97 9 °F (36 6 °C)   Ht 5' 1\" (1 549 m)   Wt 107 kg (235 lb 12 8 oz)   SpO2 98%   BMI 44 55 kg/m²     Physical Exam  Constitutional:       General: She is not in acute distress  Appearance: Normal appearance  She is not ill-appearing   " HENT:      Head: Normocephalic and atraumatic  Nose: Nose normal    Eyes:      General:         Right eye: No discharge  Left eye: No discharge  Conjunctiva/sclera: Conjunctivae normal    Cardiovascular:      Rate and Rhythm: Normal rate and regular rhythm  Abdominal:      General: Bowel sounds are normal  There is no distension  Palpations: Abdomen is soft  Tenderness: There is no abdominal tenderness  There is no right CVA tenderness, left CVA tenderness, guarding or rebound  Musculoskeletal:      Cervical back: Normal range of motion  Neurological:      Mental Status: She is alert and oriented to person, place, and time     Psychiatric:         Mood and Affect: Mood normal        MARCY Bolivar

## 2023-06-19 NOTE — ASSESSMENT & PLAN NOTE
Patient here to follow-up on diarrhea and gas pains  Patient called on Friday reporting she was having side effects of Ozempic  Patient was told to take Imodium and Pepcid for symptoms, patient no longer taking Imodium or Pepcid  Patient reports she vomited once on Friday, and symptoms resolved  Patient was taking the 2 mg of Ozempic for 2 months, but reports symptoms were very consistent with when she first started Ozempic 2 mg  Patient has been tolerating food and drinks for the last 2 days  Patient denies symptoms for the last 2 days  A1c at last visit was 5 5  Will back down Ozempic to 1 mg at this time  Patient in agreement with plan and verbalized understanding      Lab Results   Component Value Date    HGBA1C 5 5 03/30/2023 Speech Therapy    Visit Type: Treatment  Born at Gestational Age: 24w3d and now corrected age 31w 3d    Nursing comments: RN consented to this session  Present at bedside: RN  Subjective reported by: RN  Precautions: per guidelines    SUBJECTIVE  HOSPITAL PROBLEMS:  Principal Problem:    Extreme immaturity of , gestational age 24 completed weeks  Active Problems:    S/P repair of PDA    RDS (respiratory distress syndrome in the )    Extremely low birth weight , 750-999 grams    Anemia of prematurity  Resolved Problems:    On total parenteral nutrition (TPN)    PDA (patent ductus arteriosus)    Acute kidney injury (CMD)    Parent not present bedside at time of this session.  Patient / Family Goals: unable to state    Pain   RN completed pain assessment during care time    OBJECTIVE    Diet:  Non-oral:  IV and OG tube  22 q 3 hrs  Environment and Equipment:   - Bed type: isolette   - Lights: procedural lighting and covered isolette   - Sound: environmental noise present  GRAHAM  via: nasal       Infant Feeding  Onset of Session   - Interventions prior to feeding: continuous touch, neurodevelopmental handling, modify environmental sound, containment, anticipatory touch, resting hands, two person cares, neurodevelopmental positioning, modify environmental light and decreased environmental stimuli    Pre-Feeding Session   - This Speech-Language Pathologist provided containment during RN cares.   He was extubated earlier today, tolerating GRAHAM.  Infant exhibited improved tolerance of cares vs previous sessions.    Suckle response appreciated to OG; accepted breast milk swabs with suckle, then briefly latched to preemie pacifier.  Weak voice appreciated.             Education  - Present: caregiver (orientating RN)  - Education on: ways to support prefeeding development, SLP impressions from session, SLP recommendations and goals and oral exploration       ASSESSMENT                                                                           • Skilled therapy is required to establish safe means of nutrition, hydration and medication administration  • Infant presented with improved tolerance of cares, benefited 2-person cares and limitation of environmental stimuli.  He benefited from consistent containment and resting hands.  Suckle response to OG, swabs and preemie pacifier.   The infant will continue to benefit from ongoing skilled speech therapy services to provide neuroprotective cares, early cognitive stimulation, ongoing assessment of pre-feeding development, safe oral progression, and ongoing parent/caregiver education to support generalization of targeted goals for safe discharge to home.     Requires SLP Follow Up: Yes    Discharge Recommendations  SLP Referrals/Discharge Recommendations: Refer to early intervention, Refer to NICU follow up clinic    PLAN     Frequency: 1-2 x/week    Interventions:  Dysphagia therapy and communication/cognition evaluation   Plan/Goal Agreement: will attempt to contact parent/caregiver    RECOMMENDATIONS  Diet: NG-tube   - encourage parent involvement in cares and holding  - 2 person cares  - limitation of environmental stimuli  - contact Speech Therapy when 2-person needed @   Aspiration risk: significant  Factors impacting feeding: respiratory status, prematurity and comorbidities    GOALS  Review Date: 2023  Short Term Goals:   1. Patient will tolerate neuroprotective cares and handling to support neurodevelopment with physiologic stability - PROGRESSING.   2. Patient will maintain physiologic stability during oral stimulation - PROGRESSING.   3. Caregiver will participate in 1-2 sessions prior to discharge.     Long Term Goals: (to be met by time of discharge from hospital)  1. Patient will tolerate sensory input to support pre-feeding and cognitive development.   2. Caregiver will demonstrate independence facilitating recommended interventions.       Documented  in the chart in the following areas: Assessment.        Therapy procedure time and total treatment time can be found documented on the Time Entry flowsheet.

## 2023-06-19 NOTE — PROGRESS NOTES
Patient responded through [facility] and has had some increase in symptoms over the last week  They seem to have subsided at this time  I suggested she make sure she is drinking at least 80oz which could be some of her adverse effect issues  I also wanted her to make she is eating at least 1200 calories a day and her macros are balanced  Patient has an office visit today with the provider, however I am not in the same office  I sent a [employer] response to the patient and said that I would continue to follow though [employer] unless a phone outreach is warranted or she meets goal  Patient has lost 46lbs at this point  Her adverse effects could be do to a recent increase as I am not aware of where she is with Ozempic dosing  Provider made aware   Next outreach 2 weeks to check in with her regarding adverse effects

## 2023-06-19 NOTE — ASSESSMENT & PLAN NOTE
Patient here to follow-up on diarrhea and gas pains  Patient called on Friday reporting she was having side effects of Ozempic  Patient was told to take Imodium and Pepcid for symptoms, patient no longer taking Imodium or Pepcid  Patient reports she vomited once on Friday, and symptoms resolved  Patient was taking the 2 mg of Ozempic for 2 months, but reports symptoms were very consistent with when she first started Ozempic 2 mg  Patient has been tolerating food and drinks for the last 2 days  Patient denies symptoms for the last 2 days  A1c at last visit was 5 5  Will back down Ozempic to 1 mg at this time  Patient in agreement with plan and verbalized understanding

## 2023-07-05 ENCOUNTER — CLINICAL SUPPORT (OUTPATIENT)
Dept: FAMILY MEDICINE CLINIC | Facility: CLINIC | Age: 43
End: 2023-07-05

## 2023-07-05 DIAGNOSIS — E78.00 ELEVATED LDL CHOLESTEROL LEVEL: ICD-10-CM

## 2023-07-05 DIAGNOSIS — E11.9 TYPE 2 DIABETES MELLITUS WITHOUT COMPLICATION, WITHOUT LONG-TERM CURRENT USE OF INSULIN (HCC): Primary | ICD-10-CM

## 2023-07-05 DIAGNOSIS — E11.9 TYPE 2 DIABETES MELLITUS WITHOUT COMPLICATION, WITHOUT LONG-TERM CURRENT USE OF INSULIN (HCC): ICD-10-CM

## 2023-07-05 PROCEDURE — 36415 COLL VENOUS BLD VENIPUNCTURE: CPT

## 2023-07-06 ENCOUNTER — PATIENT OUTREACH (OUTPATIENT)
Dept: FAMILY MEDICINE CLINIC | Facility: CLINIC | Age: 43
End: 2023-07-06

## 2023-07-06 LAB
ALBUMIN SERPL-MCNC: 4.1 G/DL (ref 3.6–5.1)
ALBUMIN/CREAT UR: 23 MCG/MG CREAT
ALBUMIN/GLOB SERPL: 1.4 (CALC) (ref 1–2.5)
ALP SERPL-CCNC: 55 U/L (ref 31–125)
ALT SERPL-CCNC: 28 U/L (ref 6–29)
AST SERPL-CCNC: 23 U/L (ref 10–35)
BILIRUB SERPL-MCNC: 0.5 MG/DL (ref 0.2–1.2)
BUN SERPL-MCNC: 10 MG/DL (ref 7–25)
BUN/CREAT SERPL: NORMAL (CALC) (ref 6–22)
CALCIUM SERPL-MCNC: 9.8 MG/DL (ref 8.6–10.2)
CHLORIDE SERPL-SCNC: 103 MMOL/L (ref 98–110)
CHOLEST SERPL-MCNC: 193 MG/DL
CHOLEST/HDLC SERPL: 2.6 (CALC)
CO2 SERPL-SCNC: 24 MMOL/L (ref 20–32)
CREAT SERPL-MCNC: 0.65 MG/DL (ref 0.5–0.99)
CREAT UR-MCNC: 40 MG/DL (ref 20–275)
GFR/BSA.PRED SERPLBLD CYS-BASED-ARV: 111 ML/MIN/1.73M2
GLOBULIN SER CALC-MCNC: 3 G/DL (CALC) (ref 1.9–3.7)
GLUCOSE SERPL-MCNC: 91 MG/DL (ref 65–99)
HDLC SERPL-MCNC: 73 MG/DL
LDLC SERPL CALC-MCNC: 103 MG/DL (CALC)
MICROALBUMIN UR-MCNC: 0.9 MG/DL
NONHDLC SERPL-MCNC: 120 MG/DL (CALC)
POTASSIUM SERPL-SCNC: 4.1 MMOL/L (ref 3.5–5.3)
PROT SERPL-MCNC: 7.1 G/DL (ref 6.1–8.1)
SODIUM SERPL-SCNC: 136 MMOL/L (ref 135–146)
TRIGL SERPL-MCNC: 83 MG/DL

## 2023-07-06 NOTE — PROGRESS NOTES
Chart review. Patients cholesterol was remarkably improved from 6mths ago with diet change alone. Patient was seen in the office due to increase in vomiting and diarrhea which was believed to be from 2mg ozempic although she was on it for 2 mths. It sounds as if symptoms persisted during that time and therefore she was stepped down to 1mg. I reached out to patient through [facility] to check in with her and see how she is doing. I also changed patient to CM Surveillance.

## 2023-07-07 ENCOUNTER — RA CDI HCC (OUTPATIENT)
Dept: OTHER | Facility: HOSPITAL | Age: 43
End: 2023-07-07

## 2023-07-07 NOTE — PROGRESS NOTES
[address] coding opportunities          Chart Reviewed number of suggestions sent to Provider: 1     Patients Insurance        Commercial Insurance: Shankar Irizarry     E66.01: Morbid (severe) obesity due to excess calories ([address])     Per CMS/ICD 10 coding guidelines, to be used when BMI >=40

## 2023-07-13 ENCOUNTER — OFFICE VISIT (OUTPATIENT)
Dept: FAMILY MEDICINE CLINIC | Facility: CLINIC | Age: 43
End: 2023-07-13

## 2023-07-13 ENCOUNTER — PATIENT OUTREACH (OUTPATIENT)
Dept: FAMILY MEDICINE CLINIC | Facility: CLINIC | Age: 43
End: 2023-07-13

## 2023-07-13 VITALS
WEIGHT: 232.4 LBS | SYSTOLIC BLOOD PRESSURE: 94 MMHG | HEIGHT: 61 IN | BODY MASS INDEX: 43.88 KG/M2 | OXYGEN SATURATION: 98 % | DIASTOLIC BLOOD PRESSURE: 58 MMHG

## 2023-07-13 DIAGNOSIS — E28.2 POLYCYSTIC OVARIAN SYNDROME: ICD-10-CM

## 2023-07-13 DIAGNOSIS — E11.9 TYPE 2 DIABETES MELLITUS WITHOUT COMPLICATION, WITHOUT LONG-TERM CURRENT USE OF INSULIN (HCC): Primary | ICD-10-CM

## 2023-07-13 DIAGNOSIS — Z12.31 ENCOUNTER FOR SCREENING MAMMOGRAM FOR BREAST CANCER: ICD-10-CM

## 2023-07-13 PROBLEM — E66.01 MORBID OBESITY WITH BMI OF 50.0-59.9, ADULT (HCC): Status: RESOLVED | Noted: 2022-12-05 | Resolved: 2023-07-13

## 2023-07-13 PROBLEM — T88.7XXA SIDE EFFECT OF DRUG: Status: RESOLVED | Noted: 2023-06-19 | Resolved: 2023-07-13

## 2023-07-13 LAB — SL AMB POCT HEMOGLOBIN AIC: 5.4 (ref ?–6.5)

## 2023-07-13 PROCEDURE — 83036 HEMOGLOBIN GLYCOSYLATED A1C: CPT | Performed by: NURSE PRACTITIONER

## 2023-07-13 PROCEDURE — 99214 OFFICE O/P EST MOD 30 MIN: CPT | Performed by: NURSE PRACTITIONER

## 2023-07-13 NOTE — PROGRESS NOTES
Patient was seen in the office for a follow up visit. She stepped down to 1mg back in June because she was experiencing diarrhea. She has been much better on the 1mg. Her weight loss has slowed a little. I let her know this can happen and to not be discouraged, but that she can be losing inches without the lbs coming off. I also suggested that if she steps up to the 2 again and is experiencing the diarrhea to let us know so the provider can step her down again for another month. I also asked her to spot track so that she is maintaining at least a 1200 calorie diet. Patient stated water intake has been good. I moved patient to surveillance and will follow up with her periodically to make sure she is continuing to do well until goals are met.

## 2023-07-13 NOTE — PATIENT INSTRUCTIONS
Diabetes and Exercise   WHAT YOU NEED TO KNOW:   Physical activity, such as exercise, can help keep your blood sugar level steady or improve insulin resistance. Activity can help decrease your risk for heart disease, and help you lose weight. Exercise can also help lower your A1c or keep it at goal. Your diabetes care team will help you create an exercise plan. The plan will be based on the type of diabetes you have and your starting fitness level. DISCHARGE INSTRUCTIONS:   Call your local emergency number (911 in the [address]) if:   You have chest pain or shortness of breath. Return to the emergency department if:   You have a low blood sugar level and it does not improve with treatment. Symptoms are trouble thinking, a pounding heartbeat, and sweating. Your blood sugar level is above 240 mg/dL and does not come down within 15 minutes of treatment. You have blurred or double vision. Your breath has a fruity, sweet smell, or your breathing is shallow. Call your doctor or diabetes care team if:   You have ketones in your blood or urine. You have a fever. Your blood sugar levels are higher than your target goals. You often have low blood sugar levels. Your skin is red, dry, warm, or swollen. You have a wound that does not heal.    You have trouble coping with diabetes, or you feel anxious or depressed. You have questions or concerns about your condition or care. Tips to help you create and meet your exercise goals:   Set a goal for 150 minutes (2.5 hours) of moderate to vigorous aerobic activity each week. Aerobic activity helps your heart stay strong. Aerobic activity includes walking, bicycling, dancing, swimming, and raking leaves. Spread aerobic activity over 3 to 5 days. Do not take more than 2 days off in a row. It is best to do at least 10 minutes at a time and 30 minutes each day. You can work up to these goals. Remember that any activity is better than no activity.  Over time, you can make exercise more intense or last longer. You can also add more days of exercise as your fitness level improves. Your diabetes care team can help you make a step-by-step plan to achieve your goals. Set a strength training goal of 2 to 3 times a week. Take at least 1 day off in between strength training sessions. Strength training helps you keep the muscles you have and build new muscles. Strength training includes lifting weights, climbing stairs, yoga, and grupo chi.         Older adults should include balance training 2 to 3 times each week. These include walking backwards, standing on one foot, and walking heel to toe in a straight line. Other healthy activity tips:   Stretch before and after you exercise to prevent injury. Drink water or liquids that do not contain sugar before, during, and after exercise. Ask your dietitian or healthcare provider which liquids you should drink when you exercise. Do not  sit for longer than 30 minutes at a time during your day. If you cannot walk around, at least stand up. This will help you stay active and keep your blood circulating. Try to be active throughout your day. Exercise and blood sugar levels:  Check your blood sugar level before and after exercise, if you use insulin. Healthcare providers may tell you to change the amount of insulin you take or food you eat. If your blood sugar level is high, check your blood or urine for ketones before you exercise. Do not exercise if your blood sugar level is high and you have ketones. If your blood sugar level is less than 100 mg/dL, have a carbohydrate snack before you exercise. Examples are 4 to 6 crackers, ½ banana, 8 ounces (1 cup) of milk, or 4 ounces (½ cup) of juice. Follow up with your doctor or diabetes care team as directed:  Write down your questions so you remember to ask them during your visits.   © Copyright [employer] 2022 Information is for End User's use only and may not be sold, redistributed or otherwise used for commercial purposes. The above information is an  only. It is not intended as medical advice for individual conditions or treatments. Talk to your doctor, nurse or pharmacist before following any medical regimen to see if it is safe and effective for you.

## 2023-07-13 NOTE — ASSESSMENT & PLAN NOTE
Patient did not tolerate the 2mg so patient is staying on 1mg. Will retry the 2mg with the left over medication at home and patient will let us know if she tolerates the medication. Patient denies high or low blood sugar sx. Patient is on Lisinopril for renal protection, however BP is low today. Will hold Lisinopril and recheck BP in 4 weeks. A1C today is 5.4. Patient has diabetic eye exam scheduled for 7/23 with [facility].   Lab Results   Component Value Date    HGBA1C 5.5 03/30/2023

## 2023-07-13 NOTE — PROGRESS NOTES
Name: Charis Kirkland      : 1980      MRN: [id number]  Encounter Provider: MARCY Vincent  Encounter Date: 2023   Encounter department: [address] [place] [address] IN PARTNERSHIP WITH [facility]    Assessment & Plan     1. Type 2 diabetes mellitus without complication, without long-term current use of insulin ([address])  Assessment & Plan:  Patient did not tolerate the 2mg so patient is staying on 1mg. Will retry the 2mg with the left over medication at home and patient will let us know if she tolerates the medication. Patient denies high or low blood sugar sx. Patient is on Lisinopril for renal protection, however BP is low today. Will hold Lisinopril and recheck BP in 4 weeks. A1C today is 5.4. Patient has diabetic eye exam scheduled for  with [facility]. Lab Results   Component Value Date    HGBA1C 5.5 2023       Orders:  -     POCT hemoglobin A1c    2. Adult BMI 40.0-44.9 kg/sq m [facility])  Assessment & Plan:  Patient following with complex care management. Goal to consume 500 to 1,000 fewer calories per day for a 1-2 lbs weight loss per week. Increase exercise to 30 min, 5 times a week as tolerated for a goal of 150 mins a week. Calorie tracking apps such as myfitness pal can be helpful for keeping track of calorie intake. Decrease simple carbohydrates (white bread, pasta, white rice), and increasing vegetables, fruit, and protein. Increase water. 3. Polycystic ovarian syndrome  Assessment & Plan:  Patient follows with GYN      4. Encounter for screening mammogram for breast cancer  -     Mammo screening bilateral w 3d & cad; Future; Expected date: 2024           Subjective      Subjective    Charis Kirkland is a 37 y.o. female who presents for follow up of diabetes. . Current symptoms include: none.  Patient denies foot ulcerations, hyperglycemia, hypoglycemia , nausea, paresthesia of the feet, polydipsia, polyuria, visual disturbances and vomiting. Evaluation to date has been: fasting lipid panel, hemoglobin A1C and microalbuminuria. Home sugars: patient does not check sugars. Current treatments: Lisinopril and Ozempic. Last dilated eye exam scheduled for 07/23 with [facility] eye. Review of Systems   Constitutional: Negative. HENT: Negative. Eyes: Negative. Respiratory: Negative. Cardiovascular: Negative. Gastrointestinal: Negative. Endocrine: Negative. Genitourinary: Negative. Musculoskeletal: Negative. Skin: Negative. Allergic/Immunologic: Negative. Neurological: Negative. Hematological: Negative. Psychiatric/Behavioral: Negative. Current Outpatient Medications on File Prior to Visit   Medication Sig   • ascorbic acid (VITAMIN C) 500 MG tablet Take 500 mg by mouth   • Junel FE 1/20 1-20 MG-MCG per tablet Take 1 tablet by mouth daily   • lisinopril (ZESTRIL) 5 mg tablet Take 1 tablet (5 mg total) by mouth daily   • loratadine (CLARITIN) 10 mg tablet Take 10 mg by mouth daily   • semaglutide, 1 mg/dose, (Ozempic) 4 mg/3 mL injection pen Inject 0.75 mL (1 mg total) under the skin once a week       Objective     BP 94/58   Ht 5' 1" (1.549 m)   Wt 105 kg (232 lb 6.4 oz)   SpO2 98%   BMI 43.91 kg/m²     Physical Exam  Vitals and nursing note reviewed. Constitutional:       General: She is not in acute distress. Appearance: Normal appearance. HENT:      Head: Normocephalic and atraumatic. Right Ear: External ear normal.      Left Ear: External ear normal.      Nose: Nose normal.   Eyes:      General:         Right eye: No discharge. Left eye: No discharge. Conjunctiva/sclera: Conjunctivae normal.   Cardiovascular:      Rate and Rhythm: Normal rate and regular rhythm. Heart sounds: Normal heart sounds. No murmur heard. Pulmonary:      Effort: Pulmonary effort is normal.      Breath sounds: Normal breath sounds.    Abdominal:      General: Bowel sounds are normal. Palpations: Abdomen is soft. Musculoskeletal:         General: Normal range of motion. Cervical back: Normal range of motion. Right lower leg: No edema. Left lower leg: No edema. Lymphadenopathy:      Cervical: No cervical adenopathy. Skin:     General: Skin is warm and dry. Neurological:      Mental Status: She is alert and oriented to person, place, and time. Psychiatric:         Mood and Affect: Mood normal.         Behavior: Behavior normal.         Thought Content:  Thought content normal.         Judgment: Judgment normal.       MARCY Acosta

## 2023-07-13 NOTE — ASSESSMENT & PLAN NOTE
Patient following with complex care management. Goal to consume 500 to 1,000 fewer calories per day for a 1-2 lbs weight loss per week. Increase exercise to 30 min, 5 times a week as tolerated for a goal of 150 mins a week. Calorie tracking apps such as myfitness pal can be helpful for keeping track of calorie intake. Decrease simple carbohydrates (white bread, pasta, white rice), and increasing vegetables, fruit, and protein. Increase water.

## 2023-07-28 LAB
LEFT EYE DIABETIC RETINOPATHY: NORMAL
RIGHT EYE DIABETIC RETINOPATHY: NORMAL

## 2023-08-10 ENCOUNTER — OFFICE VISIT (OUTPATIENT)
Dept: FAMILY MEDICINE CLINIC | Facility: CLINIC | Age: 43
End: 2023-08-10

## 2023-08-10 VITALS
WEIGHT: 232.6 LBS | OXYGEN SATURATION: 98 % | BODY MASS INDEX: 43.92 KG/M2 | SYSTOLIC BLOOD PRESSURE: 119 MMHG | HEIGHT: 61 IN | DIASTOLIC BLOOD PRESSURE: 57 MMHG

## 2023-08-10 DIAGNOSIS — Z12.11 COLON CANCER SCREENING: Primary | ICD-10-CM

## 2023-08-10 DIAGNOSIS — E11.9 TYPE 2 DIABETES MELLITUS WITHOUT COMPLICATION, WITHOUT LONG-TERM CURRENT USE OF INSULIN (HCC): ICD-10-CM

## 2023-08-10 PROCEDURE — 99214 OFFICE O/P EST MOD 30 MIN: CPT | Performed by: NURSE PRACTITIONER

## 2023-08-10 NOTE — PATIENT INSTRUCTIONS
Diabetes and Exercise   WHAT YOU NEED TO KNOW:   Physical activity, such as exercise, can help keep your blood sugar level steady or improve insulin resistance. Activity can help decrease your risk for heart disease, and help you lose weight. Exercise can also help lower your A1c or keep it at goal. Your diabetes care team will help you create an exercise plan. The plan will be based on the type of diabetes you have and your starting fitness level. DISCHARGE INSTRUCTIONS:   Call your local emergency number (911 in the [address]) if:   You have chest pain or shortness of breath. Return to the emergency department if:   You have a low blood sugar level and it does not improve with treatment. Symptoms are trouble thinking, a pounding heartbeat, and sweating. Your blood sugar level is above 240 mg/dL and does not come down within 15 minutes of treatment. You have blurred or double vision. Your breath has a fruity, sweet smell, or your breathing is shallow. Call your doctor or diabetes care team if:   You have ketones in your blood or urine. You have a fever. Your blood sugar levels are higher than your target goals. You often have low blood sugar levels. Your skin is red, dry, warm, or swollen. You have a wound that does not heal.    You have trouble coping with diabetes, or you feel anxious or depressed. You have questions or concerns about your condition or care. Tips to help you create and meet your exercise goals:   Set a goal for 150 minutes (2.5 hours) of moderate to vigorous aerobic activity each week. Aerobic activity helps your heart stay strong. Aerobic activity includes walking, bicycling, dancing, swimming, and raking leaves. Spread aerobic activity over 3 to 5 days. Do not take more than 2 days off in a row. It is best to do at least 10 minutes at a time and 30 minutes each day. You can work up to these goals. Remember that any activity is better than no activity.  Over time, you can make exercise more intense or last longer. You can also add more days of exercise as your fitness level improves. Your diabetes care team can help you make a step-by-step plan to achieve your goals. Set a strength training goal of 2 to 3 times a week. Take at least 1 day off in between strength training sessions. Strength training helps you keep the muscles you have and build new muscles. Strength training includes lifting weights, climbing stairs, yoga, and grupo chi.         Older adults should include balance training 2 to 3 times each week. These include walking backwards, standing on one foot, and walking heel to toe in a straight line. Other healthy activity tips:   Stretch before and after you exercise to prevent injury. Drink water or liquids that do not contain sugar before, during, and after exercise. Ask your dietitian or healthcare provider which liquids you should drink when you exercise. Do not  sit for longer than 30 minutes at a time during your day. If you cannot walk around, at least stand up. This will help you stay active and keep your blood circulating. Try to be active throughout your day. Exercise and blood sugar levels:  Check your blood sugar level before and after exercise, if you use insulin. Healthcare providers may tell you to change the amount of insulin you take or food you eat. If your blood sugar level is high, check your blood or urine for ketones before you exercise. Do not exercise if your blood sugar level is high and you have ketones. If your blood sugar level is less than 100 mg/dL, have a carbohydrate snack before you exercise. Examples are 4 to 6 crackers, ½ banana, 8 ounces (1 cup) of milk, or 4 ounces (½ cup) of juice. Follow up with your doctor or diabetes care team as directed:  Write down your questions so you remember to ask them during your visits.   © Copyright [employer] 2022 Information is for End User's use only and may not be sold, redistributed or otherwise used for commercial purposes. The above information is an  only. It is not intended as medical advice for individual conditions or treatments. Talk to your doctor, nurse or pharmacist before following any medical regimen to see if it is safe and effective for you.

## 2023-08-10 NOTE — PROGRESS NOTES
Name: Rodo Jefferson      : 1980      MRN: [id number]  Encounter Provider: MARCY Minaya  Encounter Date: 8/10/2023   Encounter department: [address] [place] [address] IN PARTNERSHIP WITH [facility]    Assessment & Plan     1. Colon cancer screening  Assessment & Plan:  Referral to GI for colonoscopy    Orders:  -     Ambulatory referral to Gastroenterology; Future    2. Type 2 diabetes mellitus without complication, without long-term current use of insulin (HCC)  Assessment & Plan:  Controlled on Ozempic, patient unable to tolerate higher dose due to GI S/E. Lab Results   Component Value Date    HGBA1C 5.4 2023       Orders:  -     semaglutide, 1 mg/dose, (Ozempic) 4 mg/3 mL injection pen; Inject 0.75 mL (1 mg total) under the skin once a week    3. Adult BMI 40.0-44.9 kg/sq m [facility])  Assessment & Plan:  Goal to consume 500 to 1,000 fewer calories per day for a 1-2 lbs weight loss per week. Increase exercise to 30 min, 5 times a week as tolerated for a goal of 150 mins a week. Calorie tracking apps such as myfitness pal can be helpful for keeping track of calorie intake. Decrease simple carbohydrates (white bread, pasta, white rice), and increasing vegetables, fruit, and protein. Increase water. BMI Counseling: Body mass index is 43.95 kg/m². The BMI is above normal. Nutrition recommendations include decreasing portion sizes, encouraging healthy choices of fruits and vegetables, decreasing fast food intake, consuming healthier snacks, limiting drinks that contain sugar, moderation in carbohydrate intake, increasing intake of lean protein, reducing intake of saturated and trans fat and reducing intake of cholesterol. Exercise recommendations include moderate physical activity 150 minutes/week. Rationale for BMI follow-up plan is due to patient being overweight or obese.          Subjective        Subjective:    Rodo Jefferson is a 37 y.o. female who presents for follow-up of Type 2 diabetes mellitus. Current symptoms/problems include none and have been stable. Known diabetic complications: none  Cardiovascular risk factors: diabetes mellitus and obesity (BMI >= 30 kg/m2)  Current diabetic medications include Ozempic. Eye exam current (within one year): yes  Weight trend: decreasing steadily  Prior visit with dietician: yes - meets with complex care  Current diet: in general, a "healthy" diet        Review of Systems   Constitutional: Negative. HENT: Negative. Eyes: Negative. Respiratory: Negative. Cardiovascular: Negative. Gastrointestinal: Negative. Endocrine: Negative. Genitourinary: Negative. Musculoskeletal: Negative. Skin: Negative. Allergic/Immunologic: Negative. Neurological: Negative. Hematological: Negative. Psychiatric/Behavioral: Negative. Current Outpatient Medications on File Prior to Visit   Medication Sig   • ascorbic acid (VITAMIN C) 500 MG tablet Take 500 mg by mouth   • Junel FE 1/20 1-20 MG-MCG per tablet Take 1 tablet by mouth daily   • loratadine (CLARITIN) 10 mg tablet Take 10 mg by mouth daily   • [DISCONTINUED] semaglutide, 1 mg/dose, (Ozempic) 4 mg/3 mL injection pen Inject 0.75 mL (1 mg total) under the skin once a week   • [DISCONTINUED] lisinopril (ZESTRIL) 5 mg tablet Take 1 tablet (5 mg total) by mouth daily       Objective     /57 (BP Location: Right arm, Patient Position: Sitting, Cuff Size: Large)   Ht 5' 1" (1.549 m)   Wt 106 kg (232 lb 9.6 oz)   SpO2 98%   BMI 43.95 kg/m²     Physical Exam  Constitutional:       General: She is not in acute distress. Appearance: Normal appearance. HENT:      Head: Normocephalic and atraumatic. Right Ear: External ear normal.      Left Ear: External ear normal.      Nose: Nose normal.   Eyes:      General:         Right eye: No discharge. Left eye: No discharge.       Conjunctiva/sclera: Conjunctivae normal. Cardiovascular:      Rate and Rhythm: Normal rate and regular rhythm. Heart sounds: Normal heart sounds. No murmur heard. Pulmonary:      Effort: Pulmonary effort is normal.      Breath sounds: Normal breath sounds. Abdominal:      General: Bowel sounds are normal.      Palpations: Abdomen is soft. Musculoskeletal:         General: Normal range of motion. Cervical back: Normal range of motion. Right lower leg: No edema. Left lower leg: No edema. Lymphadenopathy:      Cervical: No cervical adenopathy. Skin:     General: Skin is warm and dry. Neurological:      Mental Status: She is alert and oriented to person, place, and time. Psychiatric:         Mood and Affect: Mood normal.         Behavior: Behavior normal.         Thought Content:  Thought content normal.         Judgment: Judgment normal.       MARCY Lopes

## 2023-08-10 NOTE — ASSESSMENT & PLAN NOTE
Controlled on Ozempic, patient unable to tolerate higher dose due to GI S/E.   Lab Results   Component Value Date    HGBA1C 5.4 07/13/2023

## 2023-08-13 DIAGNOSIS — Z12.31 ENCOUNTER FOR SCREENING MAMMOGRAM FOR BREAST CANCER: ICD-10-CM

## 2023-09-14 ENCOUNTER — OFFICE VISIT (OUTPATIENT)
Dept: FAMILY MEDICINE CLINIC | Facility: CLINIC | Age: 43
End: 2023-09-14

## 2023-09-14 VITALS
TEMPERATURE: 98.3 F | DIASTOLIC BLOOD PRESSURE: 57 MMHG | HEIGHT: 61 IN | BODY MASS INDEX: 43.12 KG/M2 | OXYGEN SATURATION: 98 % | WEIGHT: 228.4 LBS | SYSTOLIC BLOOD PRESSURE: 122 MMHG

## 2023-09-14 DIAGNOSIS — J01.10 ACUTE NON-RECURRENT FRONTAL SINUSITIS: Primary | ICD-10-CM

## 2023-09-14 PROCEDURE — DELSYM ADULT COUGH DELSYM ADULT COUGH: Performed by: NURSE PRACTITIONER

## 2023-09-14 PROCEDURE — 99213 OFFICE O/P EST LOW 20 MIN: CPT | Performed by: NURSE PRACTITIONER

## 2023-09-14 PROCEDURE — AMOXICILLIN 500MG 21 AMOXICILLIN 500MG 21: Performed by: NURSE PRACTITIONER

## 2023-09-14 RX ORDER — DEXTROMETHORPHAN POLISTIREX 30 MG/5ML
5 SUSPENSION ORAL EVERY 12 HOURS PRN
Qty: 89 ML | Refills: 0
Start: 2023-09-14

## 2023-09-14 RX ORDER — AMOXICILLIN 500 MG/1
500 CAPSULE ORAL 2 TIMES DAILY
Qty: 14 CAPSULE | Refills: 0
Start: 2023-09-14 | End: 2023-09-21

## 2023-09-14 RX ORDER — IBUPROFEN 800 MG/1
TABLET ORAL
COMMUNITY
Start: 2023-08-22

## 2023-09-14 NOTE — ASSESSMENT & PLAN NOTE
Symptoms consistent with sinusitis. Home COVID test was negative. Will treat with amoxicillin and Delson which were given in office today. Patient encouraged to increase fluids, rest, and take Tylenol as needed for discomfort. Discussed side effects and use with patient of all medications. Patient advised to call if new or worsening symptoms develop.

## 2023-09-14 NOTE — PROGRESS NOTES
Name: Beatriz Stephens      : 1980      MRN: [id number]  Encounter Provider: MARCY Mcleod  Encounter Date: 2023   Encounter department: [address] [place] [address]. Acute non-recurrent frontal sinusitis  Assessment & Plan:  Symptoms consistent with sinusitis. Home COVID test was negative. Will treat with amoxicillin and Delson which were given in office today. Patient encouraged to increase fluids, rest, and take Tylenol as needed for discomfort. Discussed side effects and use with patient of all medications. Patient advised to call if new or worsening symptoms develop. Orders:  -     amoxicillin (AMOXIL) 500 mg capsule; Take 1 capsule (500 mg total) by mouth 2 (two) times a day for 7 days  -     Dextromethorphan Polistirex ER (Delsym) 30 MG/5ML SUER; Take 5 mL (30 mg total) by mouth every 12 (twelve) hours as needed (cough)         Subjective      Patient here today with concerns of sinus pressure and nonproductive cough, dry throat and nasal congestion. Patient reports her symptoms have been occurring for over a week and are worsening. Patient reports she tested negative for COVID at home. Patient has been taking Mucinex sinus which she says has been somewhat helpful. Patient denies fever, headache, nausea, vomiting, diarrhea. Review of Systems   Constitutional: Negative. HENT: Positive for congestion, ear pain (pressure), postnasal drip, rhinorrhea, sinus pressure, sinus pain and sneezing. Negative for ear discharge, facial swelling, hearing loss, sore throat, tinnitus, trouble swallowing and voice change. Eyes: Negative. Respiratory: Positive for cough. Negative for apnea, choking, chest tightness, shortness of breath, wheezing and stridor. Cardiovascular: Negative. Gastrointestinal: Negative. Genitourinary: Negative. Musculoskeletal: Negative.     Neurological: Negative. Current Outpatient Medications on File Prior to Visit   Medication Sig   • ascorbic acid (VITAMIN C) 500 MG tablet Take 500 mg by mouth   • ibuprofen (MOTRIN) 800 mg tablet    • Junel FE 1/20 1-20 MG-MCG per tablet Take 1 tablet by mouth daily   • loratadine (CLARITIN) 10 mg tablet Take 10 mg by mouth daily   • semaglutide, 1 mg/dose, (Ozempic) 4 mg/3 mL injection pen Inject 0.75 mL (1 mg total) under the skin once a week       Objective     /57 (BP Location: Left arm, Patient Position: Sitting, Cuff Size: Standard)   Temp 98.3 °F (36.8 °C)   Ht 5' 1" (1.549 m)   Wt 104 kg (228 lb 6.4 oz)   SpO2 98%   BMI 43.16 kg/m²     Physical Exam  Vitals reviewed. Constitutional:       General: She is not in acute distress. Appearance: Normal appearance. She is ill-appearing. She is not toxic-appearing or diaphoretic. HENT:      Head: Normocephalic and atraumatic. Comments: Frontal sinuses tender to palpation. Right Ear: Tympanic membrane, ear canal and external ear normal.      Left Ear: Tympanic membrane, ear canal and external ear normal.      Nose: Nose normal.      Mouth/Throat:      Mouth: Mucous membranes are moist.      Pharynx: Oropharynx is clear. Posterior oropharyngeal erythema present. Eyes:      General:         Right eye: No discharge. Left eye: No discharge. Pupils: Pupils are equal, round, and reactive to light. Cardiovascular:      Rate and Rhythm: Normal rate and regular rhythm. Pulmonary:      Effort: Pulmonary effort is normal. No respiratory distress. Breath sounds: Normal breath sounds. No stridor. No wheezing, rhonchi or rales. Chest:      Chest wall: No tenderness. Musculoskeletal:      Cervical back: Normal range of motion. Lymphadenopathy:      Cervical: No cervical adenopathy. Skin:     General: Skin is warm and dry. Neurological:      Mental Status: She is alert and oriented to person, place, and time.    Psychiatric: Mood and Affect: Mood normal.         Behavior: Behavior normal.         Thought Content:  Thought content normal.         Judgment: Judgment normal.       MARCY Azevedo

## 2023-09-15 NOTE — PATIENT INSTRUCTIONS
Sinusitis   WHAT YOU NEED TO KNOW:   Sinusitis is inflammation or infection of your sinuses. Sinusitis is most often caused by a virus. Acute sinusitis may last up to 12 weeks. Chronic sinusitis lasts longer than 12 weeks. Recurrent sinusitis means you have 4 or more infections in 1 year. DISCHARGE INSTRUCTIONS:   Return to the emergency department if:   You have trouble breathing or wheezing that is getting worse. You have a stiff neck, a fever, or a bad headache. You cannot open your eye. Your eyeball bulges out or you cannot move your eye. You are more sleepy than normal, or you notice changes in your ability to think, move, or talk. You have swelling of your forehead or scalp. Call your doctor if:   You have vision changes, such as double vision. Your eye and eyelid are red, swollen, and painful. Your symptoms do not improve or go away after 10 days. You have nausea and are vomiting. Your nose is bleeding. You have questions or concerns about your condition or care. Medicines: Your symptoms may go away on their own. Your healthcare provider may recommend watchful waiting for up to 10 days before starting antibiotics. You may need any of the following:  Acetaminophen  decreases pain and fever. It is available without a doctor's order. Ask how much to take and how often to take it. Follow directions. Read the labels of all other medicines you are using to see if they also contain acetaminophen, or ask your doctor or pharmacist. Acetaminophen can cause liver damage if not taken correctly. NSAIDs , such as ibuprofen, help decrease swelling, pain, and fever. This medicine is available with or without a doctor's order. NSAIDs can cause stomach bleeding or kidney problems in certain people. If you take blood thinner medicine, always ask your healthcare provider if NSAIDs are safe for you. Always read the medicine label and follow directions.     Nasal steroid sprays may help decrease inflammation in your nose and sinuses. Decongestants  help reduce swelling and drain mucus in the nose and sinuses. They may help you breathe easier. Antihistamines  help dry mucus in the nose and relieve sneezing. Antibiotics  help treat or prevent a bacterial infection. Take your medicine as directed. Contact your healthcare provider if you think your medicine is not helping or if you have side effects. Tell your provider if you are allergic to any medicine. Keep a list of the medicines, vitamins, and herbs you take. Include the amounts, and when and why you take them. Bring the list or the pill bottles to follow-up visits. Carry your medicine list with you in case of an emergency. Self-care:   Rinse your sinuses as directed. Use a sinus rinse device to rinse your nasal passages with a saline (salt water) solution or distilled water. Do not use tap water. This will help thin the mucus in your nose and rinse away pollen and dirt. It will also help reduce swelling so you can breathe normally. Use a humidifier  to increase air moisture in your home. This may make it easier for you to breathe and help decrease your cough. Sleep with your head elevated. Place an extra pillow under your head before you go to sleep to help your sinuses drain. Drink liquids as directed. Ask your healthcare provider how much liquid to drink each day and which liquids are best for you. Liquids will thin the mucus in your nose and help it drain. Avoid drinks that contain alcohol or caffeine. Do not smoke, and avoid secondhand smoke. Nicotine and other chemicals in cigarettes and cigars can make your symptoms worse. Ask your healthcare provider for information if you currently smoke and need help to quit. E-cigarettes or smokeless tobacco still contain nicotine. Talk to your healthcare provider before you use these products.     Prevent the spread of germs:   Wash your hands often with soap and water. Wash your hands after you use the bathroom, change a child's diaper, or sneeze. Wash your hands before you prepare or eat food. Stay away from people who are sick. Some germs spread easily and quickly through contact. Follow up with your doctor as directed: You may be referred to an ear, nose, and throat specialist. Write down your questions so you remember to ask them during your visits. © Copyright [facility] 2022 Information is for End User's use only and may not be sold, redistributed or otherwise used for commercial purposes. The above information is an  only. It is not intended as medical advice for individual conditions or treatments. Talk to your doctor, nurse or pharmacist before following any medical regimen to see if it is safe and effective for you.

## 2023-10-09 PROBLEM — Z12.11 COLON CANCER SCREENING: Status: RESOLVED | Noted: 2023-08-10 | Resolved: 2023-10-09

## 2023-10-26 ENCOUNTER — OFFICE VISIT (OUTPATIENT)
Dept: FAMILY MEDICINE CLINIC | Facility: CLINIC | Age: 43
End: 2023-10-26

## 2023-10-26 VITALS
BODY MASS INDEX: 42.82 KG/M2 | SYSTOLIC BLOOD PRESSURE: 120 MMHG | DIASTOLIC BLOOD PRESSURE: 72 MMHG | HEART RATE: 74 BPM | OXYGEN SATURATION: 98 % | WEIGHT: 226.6 LBS

## 2023-10-26 DIAGNOSIS — Z12.11 ENCOUNTER FOR SCREENING COLONOSCOPY: ICD-10-CM

## 2023-10-26 DIAGNOSIS — Z01.419 ROUTINE GYNECOLOGICAL EXAMINATION: Primary | ICD-10-CM

## 2023-10-26 DIAGNOSIS — E11.9 TYPE 2 DIABETES MELLITUS WITHOUT COMPLICATION, WITH LONG-TERM CURRENT USE OF INSULIN (HCC): ICD-10-CM

## 2023-10-26 DIAGNOSIS — Z79.4 TYPE 2 DIABETES MELLITUS WITHOUT COMPLICATION, WITH LONG-TERM CURRENT USE OF INSULIN (HCC): ICD-10-CM

## 2023-10-26 LAB — SL AMB POCT HEMOGLOBIN AIC: 5.3 (ref ?–6.5)

## 2023-10-26 PROCEDURE — 83036 HEMOGLOBIN GLYCOSYLATED A1C: CPT | Performed by: NURSE PRACTITIONER

## 2023-10-26 PROCEDURE — S0612 ANNUAL GYNECOLOGICAL EXAMINA: HCPCS | Performed by: NURSE PRACTITIONER

## 2023-10-26 PROCEDURE — S0613 ANN BREAST EXAM: HCPCS | Performed by: NURSE PRACTITIONER

## 2023-10-26 PROCEDURE — 99214 OFFICE O/P EST MOD 30 MIN: CPT | Performed by: NURSE PRACTITIONER

## 2023-10-26 NOTE — ASSESSMENT & PLAN NOTE
Patient here for routine GYN exam.  Cervical cancer screening is not due until 2026, however internal exam and breast exam performed today. Patient is due for colonoscopy, she reports she will reschedule this for December. Patient is up-to-date on mammogram which was negative. Advised on preventative care.   We will bring patient back in December for annual physical.

## 2023-10-26 NOTE — PROGRESS NOTES
Name: Federico Leiva      : 1980      MRN: [id number]  Encounter Provider: MARCY Parker  Encounter Date: 10/26/2023   Encounter department: [address] IN PARTNERSHIP WITH [facility]    Assessment & Plan     1. Routine gynecological examination  Assessment & Plan:  Patient here for routine GYN exam.  Cervical cancer screening is not due until , however internal exam and breast exam performed today. Patient is due for colonoscopy, she reports she will reschedule this for December. Patient is up-to-date on mammogram which was negative. Advised on preventative care. We will bring patient back in December for annual physical.      2. Encounter for screening colonoscopy  Assessment & Plan:  Patient will schedule for December. 3. Adult BMI 40.0-44.9 kg/sq m [facility])  Assessment & Plan:  Goal to consume 500 to 1,000 fewer calories per day for a 1-2 lbs weight loss per week. Increase exercise to 30 min, 5 times a week as tolerated for a goal of 150 mins a week. Calorie tracking apps such as myfitness pal can be helpful for keeping track of calorie intake. Decrease simple carbohydrates (white bread, pasta, white rice), and increasing vegetables, fruit, and protein. Increase water. Orders:  -     POCT hemoglobin A1c           Subjective      female who presents for annual exam.   The patient has no complaints today. The patient is sexually active. GYN screening history: last pap: was normal.   The patient is not taking hormone replacement therapy. The patient wears seatbelts: yes. T  he patient participates in regular exercise: yes. Has the patient ever been transfused or tattooed?: no.   The patient reports that there is not domestic violence in her life.     Menstrual History:  OB History    2    Para     Term          AB 1    Living 1          Ectopic     Molar     Multiple     Live Births         Menarche age: 4  Patient's last menstrual period was   Period Cycle (Days): 3-4  Period Duration (Days): 28  Period Pattern:  Menstrual Flow: Light  Menstrual Control:  Menstrual Control Change Freq (Hours):   Dysmenorrhea:   Dysmenorrhea Symptoms: None        Review of Systems   Constitutional: Negative. HENT: Negative. Respiratory: Negative. Cardiovascular: Negative. Gastrointestinal: Negative. Genitourinary: Negative. Musculoskeletal: Negative. Skin: Negative. Neurological: Negative. Psychiatric/Behavioral: Negative. Current Outpatient Medications on File Prior to Visit   Medication Sig   • ascorbic acid (VITAMIN C) 500 MG tablet Take 500 mg by mouth   • Junel FE 1/20 1-20 MG-MCG per tablet Take 1 tablet by mouth daily   • loratadine (CLARITIN) 10 mg tablet Take 10 mg by mouth daily   • semaglutide, 1 mg/dose, (Ozempic) 4 mg/3 mL injection pen Inject 0.75 mL (1 mg total) under the skin once a week   • [DISCONTINUED] Dextromethorphan Polistirex ER (Delsym) 30 MG/5ML SUER Take 5 mL (30 mg total) by mouth every 12 (twelve) hours as needed (cough)   • [DISCONTINUED] ibuprofen (MOTRIN) 800 mg tablet        Objective     /72 (BP Location: Left arm, Patient Position: Sitting, Cuff Size: Large)   Pulse 74   Wt 103 kg (226 lb 9.6 oz)   SpO2 98%   BMI 42.82 kg/m²     Physical Exam  Vitals and nursing note reviewed. Exam conducted with a chaperone present. Constitutional:       General: She is not in acute distress. Appearance: Normal appearance. HENT:      Head: Normocephalic. Cardiovascular:      Rate and Rhythm: Normal rate and regular rhythm. Heart sounds: Normal heart sounds. Pulmonary:      Effort: Pulmonary effort is normal.      Breath sounds: Normal breath sounds. Chest:      Chest wall: No deformity, tenderness or edema. Breasts:     Breasts are symmetrical.      Right: Normal. No swelling, bleeding, inverted nipple, mass, nipple discharge, skin change or tenderness.       Left: Normal. No swelling, bleeding, inverted nipple, mass, nipple discharge, skin change or tenderness. Abdominal:      General: Abdomen is flat. Bowel sounds are normal. There is no distension. Palpations: Abdomen is soft. Tenderness: There is no abdominal tenderness. Hernia: There is no hernia in the left inguinal area or right inguinal area. Genitourinary:     General: Normal vulva. Exam position: Supine. Pubic Area: No rash or pubic lice. Labia:         Right: No rash, tenderness, lesion or injury. Left: No rash, tenderness, lesion or injury. Urethra: No urethral swelling or urethral lesion. Vagina: Normal. No signs of injury. No vaginal discharge, erythema, tenderness, bleeding, lesions or prolapsed vaginal walls. Cervix: No discharge, friability, lesion, erythema or cervical bleeding. Uterus: Normal.       Adnexa: Right adnexa normal and left adnexa normal.      Rectum: Normal.   Lymphadenopathy:      Upper Body:      Right upper body: No supraclavicular, axillary or pectoral adenopathy. Left upper body: No supraclavicular, axillary or pectoral adenopathy. Lower Body: No right inguinal adenopathy. No left inguinal adenopathy. Skin:     General: Skin is warm and dry. Neurological:      Mental Status: She is alert and oriented to person, place, and time. Psychiatric:         Mood and Affect: Mood normal.         Behavior: Behavior normal.         Thought Content:  Thought content normal.         Judgment: Judgment normal.       MARCY Rivera

## 2023-10-30 NOTE — ASSESSMENT & PLAN NOTE
A1c continues to decrease slowly. Continue to monitor diet and exercise.    Lab Results   Component Value Date    HGBA1C 5.3 10/26/2023

## 2023-11-09 ENCOUNTER — TELEPHONE (OUTPATIENT)
Dept: FAMILY MEDICINE CLINIC | Facility: CLINIC | Age: 43
End: 2023-11-09

## 2023-11-09 ENCOUNTER — PATIENT OUTREACH (OUTPATIENT)
Dept: FAMILY MEDICINE CLINIC | Facility: CLINIC | Age: 43
End: 2023-11-09

## 2023-11-09 NOTE — TELEPHONE ENCOUNTER
Called patient to touch base regarding diabetes and medication management. No answer on line. Left message for patient with direct call back number. Also sent tribr message to schedule follow up.

## 2023-11-13 PROBLEM — J01.10 ACUTE NON-RECURRENT FRONTAL SINUSITIS: Status: RESOLVED | Noted: 2023-09-14 | Resolved: 2023-11-13

## 2023-12-25 PROBLEM — Z01.419 ROUTINE GYNECOLOGICAL EXAMINATION: Status: RESOLVED | Noted: 2023-10-26 | Resolved: 2023-12-25

## 2024-01-18 ENCOUNTER — OFFICE VISIT (OUTPATIENT)
Dept: FAMILY MEDICINE CLINIC | Facility: CLINIC | Age: 44
End: 2024-01-18

## 2024-01-18 VITALS
HEART RATE: 79 BPM | WEIGHT: 230 LBS | SYSTOLIC BLOOD PRESSURE: 102 MMHG | HEIGHT: 61 IN | OXYGEN SATURATION: 98 % | BODY MASS INDEX: 43.43 KG/M2 | DIASTOLIC BLOOD PRESSURE: 80 MMHG

## 2024-01-18 DIAGNOSIS — E78.00 ELEVATED LDL CHOLESTEROL LEVEL: ICD-10-CM

## 2024-01-18 DIAGNOSIS — E11.9 TYPE 2 DIABETES MELLITUS WITHOUT COMPLICATION, WITH LONG-TERM CURRENT USE OF INSULIN (HCC): ICD-10-CM

## 2024-01-18 DIAGNOSIS — Z79.4 TYPE 2 DIABETES MELLITUS WITHOUT COMPLICATION, WITH LONG-TERM CURRENT USE OF INSULIN (HCC): ICD-10-CM

## 2024-01-18 DIAGNOSIS — Z00.00 WELL ADULT EXAM: Primary | ICD-10-CM

## 2024-01-18 LAB — SL AMB POCT HEMOGLOBIN AIC: 5.2 (ref ?–6.5)

## 2024-01-18 PROCEDURE — 99396 PREV VISIT EST AGE 40-64: CPT | Performed by: NURSE PRACTITIONER

## 2024-01-18 PROCEDURE — 83036 HEMOGLOBIN GLYCOSYLATED A1C: CPT | Performed by: NURSE PRACTITIONER

## 2024-01-18 NOTE — ASSESSMENT & PLAN NOTE
Goal to consume 500 to 1,000 fewer calories per day for a 1-2 lbs weight loss per week. Increase exercise to 30 min, 5 times a week as tolerated for a goal of 150 mins a week. Calorie tracking apps such as myfitness pal can be helpful for keeping track of calorie intake. Decrease simple carbohydrates (white bread, pasta, white rice), and increasing vegetables, fruit, and protein.  Increase water.

## 2024-01-18 NOTE — PROGRESS NOTES
ADULT ANNUAL PHYSICAL  [facility] IN PARTNERSHIP WITH [facility]'S    NAME: Rosalia Rodriguez  AGE: 43 y.o. SEX: female  : 1980     DATE: 2024     Assessment and Plan:     Problem List Items Addressed This Visit        Endocrine    Diabetes mellitus, type 2 (HCC)     Patient has been doing well on Ozempic 1 mg.  A1c today shows good control.  Encouraged low carbohydrate diet and exercise.  We did discuss increasing dose for weight loss, patient is unsure if she would like to do that at this time.  I will have clinical pharmacist reach out to patient to discuss options.  Lab Results   Component Value Date    HGBA1C 5.2 2024          Relevant Orders    POCT hemoglobin A1c (Completed)       Other    Adult BMI 40.0-44.9 kg/sq m ([facility])     Goal to consume 500 to 1,000 fewer calories per day for a 1-2 lbs weight loss per week. Increase exercise to 30 min, 5 times a week as tolerated for a goal of 150 mins a week. Calorie tracking apps such as myfitness pal can be helpful for keeping track of calorie intake. Decrease simple carbohydrates (white bread, pasta, white rice), and increasing vegetables, fruit, and protein.  Increase water.           Elevated LDL cholesterol level     Continue low fat/low process food diet.          Well adult exam - Primary     Patient here today for a well exam.  Patient declines influenza vaccine.  Patient is being treated for diabetes, A1c well-controlled today.  Patient denies current concerns.  Discussed well-balanced diet and exercise goals with patient.  Patient is up-to-date on colonoscopy, cervical cancer screening, and mammogram.            Immunizations and preventive care screenings were discussed with patient today. Appropriate education was printed on patient's after visit summary.    Counseling:  Alcohol/drug use: discussed moderation in alcohol intake, the recommendations for healthy  alcohol use, and avoidance of illicit drug use.  Dental Health: discussed importance of regular tooth brushing, flossing, and dental visits.  Injury prevention: discussed safety/seat belts, safety helmets, smoke detectors, carbon dioxide detectors, and smoking near bedding or upholstery.  Sexual health: discussed sexually transmitted diseases, partner selection, use of condoms, avoidance of unintended pregnancy, and contraceptive alternatives.  Exercise: the importance of regular exercise/physical activity was discussed. Recommend exercise 3-5 times per week for at least 30 minutes.       Depression Screening and Follow-up Plan: Patient was screened for depression during today's encounter. They screened negative with a PHQ-2 score of 0.        Return in about 4 months (around 5/18/2024) for Diabetes.     Chief Complaint:     Chief Complaint   Patient presents with   • Annual Exam   • Care Gap Request     Foot exam and A1C due today      History of Present Illness:     Adult Annual Physical   Patient here for a comprehensive physical exam. The patient reports no problems.    Diet and Physical Activity  Diet/Nutrition: well balanced diet.   Exercise: walking.      Depression Screening  PHQ-2/9 Depression Screening    Little interest or pleasure in doing things: 0 - not at all  Feeling down, depressed, or hopeless: 0 - not at all  PHQ-2 Score: 0  PHQ-2 Interpretation: Negative depression screen       General Health  Sleep: gets 7-8 hours of sleep on average.   Hearing: normal - bilateral.  Vision: most recent eye exam <1 year ago and wears glasses.   Dental: regular dental visits.       /GYN Health  Follows with gynecology? yes Follows in office  Patient is: premenopausal  Last menstrual period: 01/01/2024  Contraceptive method: oral contraceptives.    Advanced Care Planning  Do you have an advanced directive? no  Do you have a durable medical power of ? noDiabetic Foot Exam    Patient's shoes and socks  removed.    Right Foot/Ankle   Right Foot Inspection  Skin Exam: skin normal and skin intact. No dry skin, no warmth, no callus, no erythema, no maceration, no abnormal color, no pre-ulcer, no ulcer and no callus.     Toe Exam: ROM and strength within normal limits. No swelling and no tenderness    Sensory   Vibration: intact  Proprioception: intact  Monofilament testing: intact    Vascular  Capillary refills: < 3 seconds  The right DP pulse is 2+. The right PT pulse is 2+.     Left Foot/Ankle  Left Foot Inspection  Skin Exam: skin normal and skin intact. No dry skin, no warmth, no erythema, no maceration, normal color, no pre-ulcer, no ulcer and no callus.     Toe Exam: ROM and strength within normal limits. No swelling and no tenderness.     Sensory   Vibration: intact  Proprioception: intact  Monofilament testing: intact    Vascular  Capillary refills: < 3 seconds  The left DP pulse is 2+. The left PT pulse is 2+.     Assign Risk Category  No deformity present  No loss of protective sensation  No weak pulses  Risk: 0       Review of Systems:     Review of Systems   Constitutional: Negative.    HENT: Negative.     Eyes: Negative.    Respiratory: Negative.     Cardiovascular: Negative.    Gastrointestinal: Negative.    Endocrine: Negative.    Genitourinary: Negative.    Musculoskeletal: Negative.    Skin: Negative.    Allergic/Immunologic: Negative.    Neurological: Negative.    Hematological: Negative.    Psychiatric/Behavioral: Negative.        Past Medical History:     Past Medical History:   Diagnosis Date   • Allergic     seasonal   • Obesity       Past Surgical History:     Past Surgical History:   Procedure Laterality Date   •  SECTION  2013   • TUBAL LIGATION  2014      Social History:     Social History     Socioeconomic History   • Marital status: /Civil Union     Spouse name: None   • Number of children: None   • Years of education: None   • Highest education level: None  "  Occupational History   • None   Tobacco Use   • Smoking status: Never   • Smokeless tobacco: Never   Vaping Use   • Vaping status: Never Used   Substance and Sexual Activity   • Alcohol use: Yes     Alcohol/week: 2.0 standard drinks of alcohol     Types: 2 Glasses of wine per week   • Drug use: Never   • Sexual activity: Yes     Partners: Male     Birth control/protection: Other   Other Topics Concern   • None   Social History Narrative   • None     Social Determinants of Health     Financial Resource Strain: Not on file   Food Insecurity: Not on file   Transportation Needs: Not on file   Physical Activity: Not on file   Stress: Not on file   Social Connections: Not on file   Intimate Partner Violence: Not on file   Housing Stability: Not on file      Family History:     Family History   Problem Relation Age of Onset   • Hypertension Mother    • Arthritis Mother            • Stroke Maternal Grandmother            • Coronary artery disease Maternal Aunt               Current Medications:     Current Outpatient Medications   Medication Sig Dispense Refill   • ascorbic acid (VITAMIN C) 500 MG tablet Take 500 mg by mouth     • Junel FE  1-20 MG-MCG per tablet Take 1 tablet by mouth daily     • loratadine (CLARITIN) 10 mg tablet Take 10 mg by mouth daily     • semaglutide, 1 mg/dose, (Ozempic) 4 mg/3 mL injection pen Inject 0.75 mL (1 mg total) under the skin once a week 9 mL 1     No current facility-administered medications for this visit.      Allergies:     No Known Allergies   Physical Exam:     /80 (BP Location: Left arm, Patient Position: Sitting, Cuff Size: Large)   Pulse 79   Ht 5' 1\" (1.549 m)   Wt 104 kg (230 lb)   SpO2 98%   BMI 43.46 kg/m²     Physical Exam  Vitals and nursing note reviewed.   Constitutional:       General: She is not in acute distress.     Appearance: She is well-developed.   HENT:      Head: Normocephalic and atraumatic.      Right Ear: Tympanic " membrane, ear canal and external ear normal.      Left Ear: Tympanic membrane, ear canal and external ear normal.      Nose: Nose normal.      Mouth/Throat:      Mouth: Mucous membranes are moist.      Pharynx: Oropharynx is clear.   Eyes:      General:         Right eye: No discharge.         Left eye: No discharge.      Conjunctiva/sclera: Conjunctivae normal.      Pupils: Pupils are equal, round, and reactive to light.   Cardiovascular:      Rate and Rhythm: Normal rate and regular rhythm.      Pulses: no weak pulses          Dorsalis pedis pulses are 2+ on the right side and 2+ on the left side.        Posterior tibial pulses are 2+ on the right side and 2+ on the left side.      Heart sounds: No murmur heard.  Pulmonary:      Effort: Pulmonary effort is normal. No respiratory distress.      Breath sounds: Normal breath sounds.   Abdominal:      General: Bowel sounds are normal.      Palpations: Abdomen is soft.      Tenderness: There is no abdominal tenderness.   Musculoskeletal:         General: No swelling.      Cervical back: Neck supple.      Right lower leg: No edema.      Left lower leg: No edema.   Feet:      Right foot:      Skin integrity: No ulcer, skin breakdown, erythema, warmth, callus or dry skin.      Left foot:      Skin integrity: No ulcer, skin breakdown, erythema, warmth, callus or dry skin.   Skin:     General: Skin is warm and dry.      Capillary Refill: Capillary refill takes less than 2 seconds.   Neurological:      Mental Status: She is alert and oriented to person, place, and time.   Psychiatric:         Mood and Affect: Mood normal.         Behavior: Behavior normal.         Thought Content: Thought content normal.         Judgment: Judgment normal.          MARCY Morales  [facility] IN PARTNERSHIP WITH [facility]

## 2024-01-19 PROBLEM — Z00.00 WELL ADULT EXAM: Status: ACTIVE | Noted: 2024-01-19

## 2024-01-19 NOTE — ASSESSMENT & PLAN NOTE
Patient here today for a well exam.  Patient declines influenza vaccine.  Patient is being treated for diabetes, A1c well-controlled today.  Patient denies current concerns.  Discussed well-balanced diet and exercise goals with patient.  Patient is up-to-date on colonoscopy, cervical cancer screening, and mammogram.

## 2024-01-19 NOTE — ASSESSMENT & PLAN NOTE
Patient has been doing well on Ozempic 1 mg.  A1c today shows good control.  Encouraged low carbohydrate diet and exercise.  We did discuss increasing dose for weight loss, patient is unsure if she would like to do that at this time.  I will have clinical pharmacist reach out to patient to discuss options.  Lab Results   Component Value Date    HGBA1C 5.2 01/18/2024

## 2024-01-23 ENCOUNTER — PATIENT MESSAGE (OUTPATIENT)
Dept: FAMILY MEDICINE CLINIC | Facility: CLINIC | Age: 44
End: 2024-01-23

## 2024-01-24 ENCOUNTER — PATIENT OUTREACH (OUTPATIENT)
Dept: FAMILY MEDICINE CLINIC | Facility: CLINIC | Age: 44
End: 2024-01-24

## 2024-01-24 NOTE — PROGRESS NOTES
Chart Review. Patient was diagnosed with DM II and started on ozempic while working on nutrition. Patient stated during outreaches she was following along with guidelines. In 6mths she did lose 45lbs but has been teetering around 230 for last 6mths. Ultimately on 5'1 person she should be around 130-150. Pharmacist reached out to discuss changing medication. CM reached out to discuss nutrition and reiterate that a different medication could be more beneficial. Requested outreach to discuss nutrition since it has been several months since we spoke and she has been on low dose of ozempic for quite awhile. Will await a response.   
airway patent/diminished breath sounds, R/rales/no intercostal retractions

## 2024-01-29 ENCOUNTER — PATIENT OUTREACH (OUTPATIENT)
Dept: FAMILY MEDICINE CLINIC | Facility: CLINIC | Age: 44
End: 2024-01-29

## 2024-01-29 NOTE — PROGRESS NOTES
Patient responded to request for outreach. She requested a combo meeting with pharmacist as in past, but unfortunately that is not an option. Sent hours of availability for next week. Will await a response.

## 2024-02-06 ENCOUNTER — PATIENT OUTREACH (OUTPATIENT)
Age: 44
End: 2024-02-06

## 2024-02-06 NOTE — PROGRESS NOTES
Spoke with patient during scheduled outreach. She is doing really well with staying on track with eating and drinking. Her A1c is great, but her weight loss has plateaued over the last 5-6mths. She is currently on 1mg ozempic, she had adverse effects on the 2mg months ago and chose to not increase. She has meeting with pharmacist to discuss mounjaro as an option. We briefly discussed medication during call. Reminded patient that if she is experiencing any adverse reactions while on medication to reach out to CM, pharmacist or provider. Patient verbalized understanding. Next outreach via [employer] 4/3

## 2024-02-15 ENCOUNTER — CLINICAL SUPPORT (OUTPATIENT)
Dept: FAMILY MEDICINE CLINIC | Facility: CLINIC | Age: 44
End: 2024-02-15

## 2024-02-15 DIAGNOSIS — Z79.4 TYPE 2 DIABETES MELLITUS WITHOUT COMPLICATION, WITH LONG-TERM CURRENT USE OF INSULIN (HCC): Primary | ICD-10-CM

## 2024-02-15 DIAGNOSIS — E11.9 TYPE 2 DIABETES MELLITUS WITHOUT COMPLICATION, WITH LONG-TERM CURRENT USE OF INSULIN (HCC): Primary | ICD-10-CM

## 2024-02-15 NOTE — PROGRESS NOTES
[facility]  Jennifer Ash, Pharmacist    Rosalia Rodriguez is a 44 y.o. female who was referred to the pharmacist for newly diagnosed Type 2 diabetes education and management, referred by MARCY Morales .    Telemedicine consent  The patient was identified by name and date of birth. Rosalia Rodriguez was informed that this is a telemedicine visit and that the visit is being conducted through Telephone.  My office door was closed. No one else was in the room.  She acknowledged consent and understanding of privacy and security of the video platform. The patient has agreed to participate and understands they can discontinue the visit at any time.    Assessment/ Plan     Type 2 Diabetes:  Goal A1c <6.5% individualized based on age and duration.  Last A1c  Lab Results   Component Value Date    HGBA1C 5.2 01/18/2024      Complications:  Microvascular complications: None  Macrovascular complications: None   Current Diabetes Regimen:  Ozempic 1 mg weekly     Changes to Medication Regimen:   If PCP is in agreement with plan  Discontinue Ozempic.   Rx pended to initiate Mounjaro 2.5 mg weekly for additional weight loss benefit.   Patient has 1 dose left of Ozempic 1 mg weekly that she plans to use if pharmacy cannot get mounjaro filled by Sunday. Otherwise, she plans to start Mounjaro 2/18/24.  Reviewed mechanism for dual GLP/GIP RA, potential side effects, pen design, injection site, dosing, and answered all questions patient had. No personal hx of pancreatitis. No personal or family hx MEN2 or MTC.     Prior auth submitted via cover my meds and approved Key: [id number] . Coverage Start Date:01/16/2024;Coverage End Date:02/14/2025    2. Additional Therapies:  Statin: no - statin therapy is indicated.  ACEI/ARB: no- not indicated at this time; BP controleld   ASA: no- not indicated for primary prevention     3. Health Maintenance:  Eye Exam: up to date  Foot Exam: up to  date  Urine Microalbumin: up to date    Follow-up: 3 weeks via phone    Subjective     DM Medication Adherence/ Tolerability:  Ozempic 1 mg weekly-1 dose left. Tried to increase to 2 mg but had GI related side effects. DM well controlled but weight loss has plateaued.      Medications Tried in Past:  Metformin 500 mg TID for PCOS. Did not tolerate- did not absorb medication.     2. Lifestyle:   Working with care management for diet and lifestyle adjustments.     3. Home monitoring devices  Glucometer: No  CGM: No; Previously used Freestyle Bryce 3  Objective       Lab Results   Component Value Date    HGBA1C 5.2 01/18/2024    HGBA1C 5.3 10/26/2023    HGBA1C 5.4 07/13/2023       Weight on Home scale:   3/2/23: 253 lbs    Labs:    Lab Results   Component Value Date    SODIUM 136 07/05/2023    K 4.1 07/05/2023    EGFR 111 07/05/2023    CREATININE 0.65 07/05/2023     Pharmacist Tracking Tool     Pharmacist Tracking Tool  Reason For Outreach: Embedded Pharmacist  Demographics:  Intervention Method: Phone  Type of Intervention: Follow-Up  Topics Addressed: Diabetes  Pharmacologic Interventions: Medication Initiation and Medication Discontinuation  Non-Pharmacologic Interventions: Disease state education and Medication/Device education  Time:  Direct Patient Care:  15  mins  Care Coordination:  15  mins  Recommendation Recipient: Patient/Caregiver and Provider  Outcome: Accepted

## 2024-02-20 ENCOUNTER — TELEPHONE (OUTPATIENT)
Age: 44
End: 2024-02-20

## 2024-02-20 NOTE — TELEPHONE ENCOUNTER
Called patient to discuss that Mounjaro is unavailable at her pharmacy. Advised we can send to [facility] pharmacy but need patient to confirm which pharmacy she would like medication sent to. Awaiting pt's call.

## 2024-02-21 ENCOUNTER — RA CDI HCC (OUTPATIENT)
Dept: OTHER | Facility: HOSPITAL | Age: 44
End: 2024-02-21

## 2024-02-21 PROBLEM — Z00.00 WELL ADULT EXAM: Status: RESOLVED | Noted: 2024-01-19 | Resolved: 2024-02-21

## 2024-02-22 DIAGNOSIS — Z79.4 TYPE 2 DIABETES MELLITUS WITHOUT COMPLICATION, WITH LONG-TERM CURRENT USE OF INSULIN (HCC): ICD-10-CM

## 2024-02-22 DIAGNOSIS — E11.9 TYPE 2 DIABETES MELLITUS WITHOUT COMPLICATION, WITH LONG-TERM CURRENT USE OF INSULIN (HCC): ICD-10-CM

## 2024-02-24 DIAGNOSIS — Z79.4 TYPE 2 DIABETES MELLITUS WITHOUT COMPLICATION, WITH LONG-TERM CURRENT USE OF INSULIN (HCC): Primary | ICD-10-CM

## 2024-02-24 DIAGNOSIS — E11.9 TYPE 2 DIABETES MELLITUS WITHOUT COMPLICATION, WITH LONG-TERM CURRENT USE OF INSULIN (HCC): Primary | ICD-10-CM

## 2024-02-26 ENCOUNTER — TELEPHONE (OUTPATIENT)
Dept: FAMILY MEDICINE CLINIC | Facility: CLINIC | Age: 44
End: 2024-02-26

## 2024-02-26 ENCOUNTER — PATIENT OUTREACH (OUTPATIENT)
Age: 44
End: 2024-02-26

## 2024-02-26 NOTE — TELEPHONE ENCOUNTER
Cherry is reaching out to the patient. We discussed going back to [facility]. Cherry is working on a prior auth for Ozempic. Thank you

## 2024-02-26 NOTE — TELEPHONE ENCOUNTER
MOUNJARO 2.5mg PEN IS CURRENTLY ON BACK ORDER AND UNAVAILABLE AT THIS TIME. PLEASE ADVISE, THANKS!

## 2024-02-26 NOTE — PROGRESS NOTES
Received an email regarding PA for patients ozempic. Patient was switching to mounjaro. Chart review revealed that she was unable to get it fill right away and used last dose of ozempic. A new script to pharmacy for ozempic again triggered a new PA. PA completed reached out to patient to provide update and game plan on how to get mounjaro. Will await a response. Provider updated as well.

## 2024-02-27 ENCOUNTER — PATIENT OUTREACH (OUTPATIENT)
Age: 44
End: 2024-02-27

## 2024-02-27 RX ORDER — TIRZEPATIDE 2.5 MG/.5ML
2 INJECTION, SOLUTION SUBCUTANEOUS
COMMUNITY
Start: 2024-02-26

## 2024-02-27 NOTE — PROGRESS NOTES
Patient reached out to let CM know that she found med at hospital pharmacy, however that order had been cancelled. Called pharmacist at preferred pharmacy to check on status of order and fill. Label was printed prior to cancel, therefore it was billed and ready to go. Sent Thanx message reply to patient letting her know and asked if she would like to stay with this pharmacy or go back to original one. Will await a response.

## 2024-02-28 ENCOUNTER — TELEPHONE (OUTPATIENT)
Dept: FAMILY MEDICINE CLINIC | Facility: CLINIC | Age: 44
End: 2024-02-28

## 2024-03-07 ENCOUNTER — CLINICAL SUPPORT (OUTPATIENT)
Dept: FAMILY MEDICINE CLINIC | Facility: CLINIC | Age: 44
End: 2024-03-07

## 2024-03-07 DIAGNOSIS — Z79.4 TYPE 2 DIABETES MELLITUS WITHOUT COMPLICATION, WITH LONG-TERM CURRENT USE OF INSULIN (HCC): Primary | ICD-10-CM

## 2024-03-07 DIAGNOSIS — E11.9 TYPE 2 DIABETES MELLITUS WITHOUT COMPLICATION, WITH LONG-TERM CURRENT USE OF INSULIN (HCC): Primary | ICD-10-CM

## 2024-03-07 NOTE — PROGRESS NOTES
[facility]  Jennifer Ash, Pharmacist    Rosalia Rodriguez is a 44 y.o. female who was referred to the pharmacist for newly diagnosed Type 2 diabetes education and management, referred by MARCY Morales .    Telemedicine consent  The patient was identified by name and date of birth. Rosalia Rodriguez was informed that this is a telemedicine visit and that the visit is being conducted through Telephone.  My office door was closed. No one else was in the room.  She acknowledged consent and understanding of privacy and security of the video platform. The patient has agreed to participate and understands they can discontinue the visit at any time.    Assessment/ Plan     Type 2 Diabetes:  Goal A1c <6.5% individualized based on age and duration.  Last A1c  Lab Results   Component Value Date    HGBA1C 5.2 01/18/2024      Complications:  Microvascular complications: None  Macrovascular complications: None   Current Diabetes Regimen:  Mounjaro 2.5 mg weekly    Changes to Medication Regimen:   If PCP is in agreement with plan  Tolerating Mounjaro 2.5 mg weekly. In 2 weeks, increase further to Mounjaro 5 mg weekly.   Patient is on Junel FE oral contraception. Discussed with patient Mounjaro's impact on absorption during titration period. Patient did have tubal ligation.     2. Additional Therapies:  Statin: no - statin therapy is indicated.  ACEI/ARB: no- not indicated at this time; BP controleld   ASA: no- not indicated for primary prevention     3. Health Maintenance:  Eye Exam: up to date  Foot Exam: up to date  Urine Microalbumin: up to date    Follow-up: 4 weeks via phone    Subjective     DM Medication Adherence/ Tolerability:  Mounjaro 2.5 mg weekly - denies side effect. Denies N/V/D. Denies constipation or abdominal pain. Moving injection day to Sundays. Not has much appetite suppression so far on Mounjaro compared to Ozempic however this is expected since she is on  the lowest dose of Mounjaro.      Medications Tried in Past:  Metformin 500 mg TID for PCOS. Did not tolerate- did not absorb medication.     2. Lifestyle:   Working with care management for diet and lifestyle adjustments.     3. Home monitoring devices  Glucometer: No  CGM: No; Previously used Freestyle Bryce 3  Objective       Lab Results   Component Value Date    HGBA1C 5.2 01/18/2024    HGBA1C 5.3 10/26/2023    HGBA1C 5.4 07/13/2023       Weight on Home scale:   3/2/23: 253 lbs  3/7/24: 229 lbs    Labs:    Lab Results   Component Value Date    SODIUM 136 07/05/2023    K 4.1 07/05/2023    EGFR 111 07/05/2023    CREATININE 0.65 07/05/2023     Pharmacist Tracking Tool     Pharmacist Tracking Tool  Reason For Outreach: Embedded Pharmacist  Demographics:  Intervention Method: Phone  Type of Intervention: Follow-Up  Topics Addressed: Diabetes  Pharmacologic Interventions: Dose or Frequency Adjusted and Drug Interaction   Non-Pharmacologic Interventions: Disease state education and Medication/Device education  Time:  Direct Patient Care:  15  mins  Care Coordination:  15  mins  Recommendation Recipient: Patient/Caregiver and Provider  Outcome: Accepted

## 2024-04-04 ENCOUNTER — CLINICAL SUPPORT (OUTPATIENT)
Dept: FAMILY MEDICINE CLINIC | Facility: CLINIC | Age: 44
End: 2024-04-04

## 2024-04-04 DIAGNOSIS — E11.9 TYPE 2 DIABETES MELLITUS WITHOUT COMPLICATION, WITH LONG-TERM CURRENT USE OF INSULIN (HCC): Primary | ICD-10-CM

## 2024-04-04 DIAGNOSIS — Z79.4 TYPE 2 DIABETES MELLITUS WITHOUT COMPLICATION, WITH LONG-TERM CURRENT USE OF INSULIN (HCC): Primary | ICD-10-CM

## 2024-04-04 RX ORDER — MULTIVITAMIN
1 CAPSULE ORAL DAILY
COMMUNITY

## 2024-04-04 NOTE — PROGRESS NOTES
[facility] Services  Jennifer Ash, Pharmacist    Rosalia Rodriguez is a 44 y.o. female who was referred to the pharmacist for newly diagnosed Type 2 diabetes education and management, referred by MARCY Morales .    Telemedicine consent  The patient was identified by name and date of birth. Rosalia Rodriguez was informed that this is a telemedicine visit and that the visit is being conducted through Telephone.  My office door was closed. No one else was in the room.  She acknowledged consent and understanding of privacy and security of the video platform. The patient has agreed to participate and understands they can discontinue the visit at any time.    Assessment/ Plan     Type 2 Diabetes:  Goal A1c <6.5% individualized based on age and duration.  Last A1c  Lab Results   Component Value Date    HGBA1C 5.2 01/18/2024      Complications:  Microvascular complications: None  Macrovascular complications: None   Current Diabetes Regimen:  Mounjaro 5 mg weekly    Changes to Medication Regimen:   If PCP is in agreement with plan  Tolerating Mounjaro 5 mg weekly. Increase further to Mounjaro 7.5 mg weekly. Rx pended to send to pharmacy.   Patient reports she stopped Junel FE . Removed from med list. She is letting her Gyn know that she stopped it.   Also reports recent hair thinning. I did disucss with her the supplements that Dr. Wilkerson had recommended. She started a multivitamin that is higher in vitamin B.    2. Additional Therapies:  Statin: no - statin therapy is indicated.  ACEI/ARB: no- not indicated at this time; BP controleld   ASA: no- not indicated for primary prevention     3. Health Maintenance:  Eye Exam: up to date  Foot Exam: up to date  Urine Microalbumin: up to date    Follow-up: 4 weeks via phone    Subjective     DM Medication Adherence/ Tolerability:  Mounjaro 5 mg weekly - Has done 1 injection. denies side effect. Denies N/V/D. Denies constipation or  abdominal pain. Injection day is Sunday. Controlling cravings during the day. Having more cravings for food in the evening (bedtime). Will have something small or drink water.      Medications Tried in Past:  Metformin 500 mg TID for PCOS. Did not tolerate- did not absorb medication.     2. Lifestyle:   Working with care management for diet and lifestyle adjustments.     3. Home monitoring devices  Glucometer: No  CGM: No; Previously used Freestyle Bryce 3  Objective       Lab Results   Component Value Date    HGBA1C 5.2 01/18/2024    HGBA1C 5.3 10/26/2023    HGBA1C 5.4 07/13/2023       Weight on Home scale:   3/2/23: 253 lbs  3/7/24: 229 lbs    Labs:    Lab Results   Component Value Date    SODIUM 136 07/05/2023    K 4.1 07/05/2023    EGFR 111 07/05/2023    CREATININE 0.65 07/05/2023     Pharmacist Tracking Tool     Pharmacist Tracking Tool  Reason For Outreach: Embedded Pharmacist  Demographics:  Intervention Method: Phone  Type of Intervention: Follow-Up  Topics Addressed: Diabetes  Pharmacologic Interventions: Dose or Frequency Adjusted  Non-Pharmacologic Interventions: Disease state education and Medication/Device education  Time:  Direct Patient Care:  15  mins  Care Coordination:  15  mins  Recommendation Recipient: Patient/Caregiver and Provider  Outcome: Accepted

## 2024-05-02 ENCOUNTER — OFFICE VISIT (OUTPATIENT)
Age: 44
End: 2024-05-02
Payer: COMMERCIAL

## 2024-05-02 VITALS
WEIGHT: 236 LBS | BODY MASS INDEX: 44.56 KG/M2 | HEART RATE: 74 BPM | HEIGHT: 61 IN | RESPIRATION RATE: 14 BRPM | DIASTOLIC BLOOD PRESSURE: 77 MMHG | OXYGEN SATURATION: 97 % | SYSTOLIC BLOOD PRESSURE: 121 MMHG | TEMPERATURE: 97.5 F

## 2024-05-02 DIAGNOSIS — L03.116 CELLULITIS OF LEFT LOWER EXTREMITY: ICD-10-CM
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
## 2024-05-02 PROCEDURE — 99213 OFFICE O/P EST LOW 20 MIN: CPT | Performed by: NURSE PRACTITIONER

## 2024-05-02 RX ORDER — TRIAMCINOLONE ACETONIDE 1 MG/G
CREAM TOPICAL 2 TIMES DAILY
Qty: 15 G | Refills: 0 | Status: SHIPPED | OUTPATIENT
Start: 2024-05-02

## 2024-05-02 RX ORDER — DOXYCYCLINE 100 MG/1
100 CAPSULE ORAL 2 TIMES DAILY
Qty: 20 CAPSULE | Refills: 0 | Status: SHIPPED | OUTPATIENT
Start: 2024-05-02 | End: 2024-05-12

## 2024-05-02 NOTE — PROGRESS NOTES
[facility]        NAME: Rosalia Rodriguez is a 44 y.o. female  : 1980    MRN: [id number]  DATE: May 2, 2024  TIME: 5:43 PM    Assessment and Plan   Bug bite, initial encounter [W57.XXXA]  1. Bug bite, initial encounter  triamcinolone (KENALOG) 0.1 % cream      2. Cellulitis of left lower extremity  doxycycline monohydrate (MONODOX) 100 mg capsule        Acute symptomatic will recommend start doxycycline twice a day for 10 days and triamcinolone twice daily as needed educated on side effects proper use of medication follow-up with PCP with worsening symptoms no improvement    Patient Instructions       Follow up with PCP in 3-5 days.  Proceed to  ER if symptoms worsen.    If tests have been performed at [facility], our office will contact you with results if changes need to be made to the care plan discussed with you at the visit.  You can review your full results on [facility].    Chief Complaint     Chief Complaint   Patient presents with   • Insect Bite     Left lower leg insect bite (type of bug unknown) happened Monday   Has used itch cream and antibiotic cream this AM   States it is painful, not itchy          History of Present Illness       Patient is a 44-year-old female arrives with complaints of a bug bite to the left medial lower leg.  She reports that occurred on Monday unsure of what better however within the last couple days it now is surrounding and spreading with redness swelling warmth.  Is associated with pain.  Denies any itching and fever        Review of Systems   Review of Systems   Constitutional:  Negative for activity change, appetite change, chills, fatigue and fever.   HENT:  Negative for congestion, postnasal drip, rhinorrhea, sneezing and sore throat.    Respiratory:  Negative for cough, chest tightness, shortness of breath and wheezing.    Cardiovascular:  Negative for chest pain and palpitations.   Gastrointestinal:  Negative for abdominal pain, constipation,  diarrhea, nausea and vomiting.   Musculoskeletal:  Negative for arthralgias and myalgias.   Skin:  Positive for color change. Negative for pallor and rash.   Neurological:  Negative for dizziness, weakness, light-headedness and headaches.   Hematological:  Negative for adenopathy.   Psychiatric/Behavioral:  Negative for agitation and confusion.          Current Medications       Current Outpatient Medications:   •  ascorbic acid (VITAMIN C) 500 MG tablet, Take 500 mg by mouth, Disp: , Rfl:   •  doxycycline monohydrate (MONODOX) 100 mg capsule, Take 1 capsule (100 mg total) by mouth 2 (two) times a day for 10 days, Disp: 20 capsule, Rfl: 0  •  loratadine (CLARITIN) 10 mg tablet, Take 10 mg by mouth daily, Disp: , Rfl:   •  tirzepatide (Mounjaro) 7.5 MG/0.5ML, Inject 0.5 mL (7.5 mg total) under the skin every 7 days, Disp: 2 mL, Rfl: 2  •  triamcinolone (KENALOG) 0.1 % cream, Apply topically 2 (two) times a day, Disp: 15 g, Rfl: 0  •  Multiple Vitamin (multivitamin) capsule, Take 1 capsule by mouth daily (Patient not taking: Reported on 2024), Disp: , Rfl:     Current Allergies     Allergies as of 2024   • (No Known Allergies)            The following portions of the patient's history were reviewed and updated as appropriate: allergies, current medications, past family history, past medical history, past social history, past surgical history and problem list.     Past Medical History:   Diagnosis Date   • Allergic     seasonal   • Obesity        Past Surgical History:   Procedure Laterality Date   •  SECTION  2013   • TUBAL LIGATION  2014       Family History   Problem Relation Age of Onset   • Hypertension Mother    • Arthritis Mother            • Coronary artery disease Maternal Aunt            • Stroke Maternal Grandmother                  Medications have been verified.        Objective   /77   Pulse 74   Temp 97.5 °F (36.4 °C) (Tympanic)   Resp 14   Ht  "5' 1\" (1.549 m)   Wt 107 kg (236 lb)   LMP 04/30/2024 (Exact Date)   SpO2 97%   BMI 44.59 kg/m²   Patient's last menstrual period was 04/30/2024 (exact date).       Physical Exam     Physical Exam  Vitals and nursing note reviewed.   Constitutional:       General: She is not in acute distress.     Appearance: Normal appearance. She is not ill-appearing or diaphoretic.   HENT:      Head: Normocephalic and atraumatic.      Nose: No congestion or rhinorrhea.   Eyes:      General: No scleral icterus.        Right eye: No discharge.         Left eye: No discharge.   Pulmonary:      Effort: Pulmonary effort is normal. No respiratory distress.   Musculoskeletal:         General: Normal range of motion.      Cervical back: Normal range of motion.   Skin:     Coloration: Skin is not jaundiced or pale.      Findings: Erythema present. No bruising or rash.             Comments: Notable bug bite with central induration surrounding redness swelling warmth to the area approximate size of a base ball   Neurological:      General: No focal deficit present.      Mental Status: She is alert and oriented to person, place, and time.   Psychiatric:         Mood and Affect: Mood normal.         Behavior: Behavior normal.         Thought Content: Thought content normal.         Judgment: Judgment normal.                   "

## 2024-05-09 ENCOUNTER — CLINICAL SUPPORT (OUTPATIENT)
Dept: FAMILY MEDICINE CLINIC | Facility: CLINIC | Age: 44
End: 2024-05-09

## 2024-05-09 DIAGNOSIS — Z79.4 TYPE 2 DIABETES MELLITUS WITHOUT COMPLICATION, WITH LONG-TERM CURRENT USE OF INSULIN (HCC): Primary | ICD-10-CM

## 2024-05-09 DIAGNOSIS — E11.9 TYPE 2 DIABETES MELLITUS WITHOUT COMPLICATION, WITH LONG-TERM CURRENT USE OF INSULIN (HCC): Primary | ICD-10-CM

## 2024-05-09 DIAGNOSIS — Z12.31 ENCOUNTER FOR SCREENING MAMMOGRAM FOR BREAST CANCER: ICD-10-CM

## 2024-05-09 NOTE — PROGRESS NOTES
[facility]  Jennifer Ash, Pharmacist    Rosalia Rodriguez is a 44 y.o. female who was referred to the pharmacist for newly diagnosed Type 2 diabetes education and management, referred by MARCY Morales .    Telemedicine consent  The patient was identified by name and date of birth. Rosalia Rodriguez was informed that this is a telemedicine visit and that the visit is being conducted through Telephone.  My office door was closed. No one else was in the room.  She acknowledged consent and understanding of privacy and security of the video platform. The patient has agreed to participate and understands they can discontinue the visit at any time.    Assessment/ Plan     Type 2 Diabetes:  Goal A1c <6.5% individualized based on age and duration.  Last A1c  Lab Results   Component Value Date    HGBA1C 5.2 01/18/2024      Complications:  Microvascular complications: None  Macrovascular complications: None   Current Diabetes Regimen:  Mounjaro 7.5 mg weekly    Changes to Medication Regimen:   If PCP is in agreement with plan  Tolerating Mounjaro 7.5 mg weekly. Increase further to Mounjaro 10 mg weekly. Rx pended to send to pharmacy.     2. Additional Therapies:  Statin: no - statin therapy is indicated.  ACEI/ARB: no- not indicated at this time; BP controleld   ASA: no- not indicated for primary prevention     3. Health Maintenance:  Eye Exam: up to date  Foot Exam: up to date  Urine Microalbumin: up to date    Follow-up: 4 weeks with PCP ; 8 weeks with clinical pharmacist    Subjective     DM Medication Adherence/ Tolerability:  Mounjaro 7.5 mg weekly - Has done 3 injection. denies side effect. Denies N/V/D. Denies constipation or abdominal pain. Injection day is Sunday. Feels medication is controlling cravings but not having as much weight loss on this current dose      Medications Tried in Past:  Metformin 500 mg TID for PCOS. Did not tolerate- did not absorb  medication.     2. Lifestyle:   Working with care management for diet and lifestyle adjustments.     3. Home monitoring devices  Glucometer: No  CGM: No; Previously used Freestyle Bryce 3  Objective       Lab Results   Component Value Date    HGBA1C 5.2 01/18/2024    HGBA1C 5.3 10/26/2023    HGBA1C 5.4 07/13/2023       Weight on Home scale:   3/2/23: 253 lbs  3/7/24: 229 lbs  5/6/24: 231 lbs    Labs:    Lab Results   Component Value Date    SODIUM 136 07/05/2023    K 4.1 07/05/2023    EGFR 111 07/05/2023    CREATININE 0.65 07/05/2023     Pharmacist Tracking Tool     Pharmacist Tracking Tool  Reason For Outreach: Embedded Pharmacist  Demographics:  Intervention Method: Phone  Type of Intervention: Follow-Up  Topics Addressed: Diabetes  Pharmacologic Interventions: Dose or Frequency Adjusted  Non-Pharmacologic Interventions: Disease state education and Medication/Device education  Time:  Direct Patient Care:  15  mins  Care Coordination:  15  mins  Recommendation Recipient: Patient/Caregiver and Provider  Outcome: Accepted

## 2024-05-15 ENCOUNTER — PATIENT OUTREACH (OUTPATIENT)
Dept: FAMILY MEDICINE CLINIC | Facility: CLINIC | Age: 44
End: 2024-05-15

## 2024-05-15 NOTE — PROGRESS NOTES
Chart Review. Patient has been controlled with diabetes and no longer requires CM services. Sent [employer] message to patient with how to get ahold of CM if needed in the future. Case closed.

## 2024-05-21 ENCOUNTER — TELEPHONE (OUTPATIENT)
Dept: FAMILY MEDICINE CLINIC | Facility: CLINIC | Age: 44
End: 2024-05-21

## 2024-05-21 NOTE — TELEPHONE ENCOUNTER
Pt started a  new dose of mounjaro this past Sunday. Pt feels crampings , gases and diarrhea. I told pt that sound like secondary effects of medicine to keep eating light diet and call to the office if symptoms dont improve. Please advise pt and let us know if theres anything we can do for pt.   Thanks

## 2024-06-11 DIAGNOSIS — Z79.4 TYPE 2 DIABETES MELLITUS WITHOUT COMPLICATION, WITH LONG-TERM CURRENT USE OF INSULIN (HCC): ICD-10-CM

## 2024-06-11 DIAGNOSIS — E11.9 TYPE 2 DIABETES MELLITUS WITHOUT COMPLICATION, WITH LONG-TERM CURRENT USE OF INSULIN (HCC): ICD-10-CM

## 2024-06-11 NOTE — TELEPHONE ENCOUNTER
Refill requested for Mounjaro . Discussed with PCP and we both agree to continue 10 mg weekly due to GI side effects when medication was first increased. Rx sent per CPA agreement. No further action needed

## 2024-06-17 ENCOUNTER — OFFICE VISIT (OUTPATIENT)
Dept: FAMILY MEDICINE CLINIC | Facility: CLINIC | Age: 44
End: 2024-06-17

## 2024-06-17 VITALS
SYSTOLIC BLOOD PRESSURE: 130 MMHG | HEIGHT: 61 IN | OXYGEN SATURATION: 98 % | WEIGHT: 234 LBS | BODY MASS INDEX: 44.18 KG/M2 | HEART RATE: 70 BPM | DIASTOLIC BLOOD PRESSURE: 62 MMHG

## 2024-06-17 DIAGNOSIS — Z12.31 SCREENING MAMMOGRAM FOR BREAST CANCER: ICD-10-CM

## 2024-06-17 DIAGNOSIS — Z79.4 TYPE 2 DIABETES MELLITUS WITHOUT COMPLICATION, WITH LONG-TERM CURRENT USE OF INSULIN (HCC): Primary | ICD-10-CM

## 2024-06-17 DIAGNOSIS — E11.9 TYPE 2 DIABETES MELLITUS WITHOUT COMPLICATION, WITH LONG-TERM CURRENT USE OF INSULIN (HCC): Primary | ICD-10-CM

## 2024-06-17 LAB — SL AMB POCT HEMOGLOBIN AIC: 5.2 (ref ?–6.5)

## 2024-06-17 PROCEDURE — 83036 HEMOGLOBIN GLYCOSYLATED A1C: CPT | Performed by: NURSE PRACTITIONER

## 2024-06-17 PROCEDURE — 99214 OFFICE O/P EST MOD 30 MIN: CPT | Performed by: NURSE PRACTITIONER

## 2024-06-17 NOTE — PROGRESS NOTES
Ambulatory Visit  Name: Rosalia Rodriguez      : 1980      MRN: [id number]  Encounter Provider: MARCY Morales  Encounter Date: 2024   Encounter department[facility]    Assessment & Plan   1. Type 2 diabetes mellitus without complication, with long-term current use of insulin ([facility])  Assessment & Plan:  She is going next month for an eye exam [facility] eye. Micro/albumin creat ratio urine taken in office. Pt would like to stay on Mounjaro 10mg, due to SE of increased dose. Refill placed.   Lab Results   Component Value Date    HGBA1C 5.2 2024     Orders:  -     POCT hemoglobin A1c  -     Microalbumin, Random Urine (W/Creatinine)  -     Lipid Panel with Direct LDL reflex; Future  -     Comprehensive metabolic panel; Future  -     CBC and differential; Future  -     tirzepatide (Mounjaro) 10 MG/0.5ML; Inject 0.5 mL (10 mg total) under the skin every 7 days  2. Adult BMI 40.0-44.9 kg/sq m ([facility])  Assessment & Plan:  Goal to consume 500 to 1,000 fewer calories per day for a 1-2 lbs weight loss per week. Increase exercise to 30 min, 5 times a week as tolerated for a goal of 150 mins a week. Calorie tracking apps such as myfitness pal can be helpful for keeping track of calorie intake. Decrease simple carbohydrates (white bread, pasta, white rice), and increasing vegetables, fruit, and protein.  Increase water.    Orders:  -     Lipid Panel with Direct LDL reflex; Future  -     Comprehensive metabolic panel; Future  -     CBC and differential; Future  3. Screening mammogram for breast cancer  -     Mammo diagnostic bilateral w 3d & cad; Future       History of Present Illness   {Disappearing Hyperlinks I Encounters * My Last Note * Since Last Visit * History :92368}    Subjective:    Rosalia Rodriguez is a 44 y.o. female who presents for follow-up of Type 2 diabetes mellitus.  Current symptoms/problems include none and have  "been stable.   Known diabetic complications: none  Cardiovascular risk factors: diabetes mellitus, dyslipidemia, and obesity (BMI >= 30 kg/m2)  Current diabetic medications include Mounjaro.   Eye exam current (within one year): yes  Weight trend: stable  Prior visit with dietician: yes -   Current diet: in general, a \"healthy\" diet    Current exercise: walking    Current monitoring regimen: none      Is She on ACE inhibitor or angiotensin II receptor blocker?   She declines due to low blood pressure when she takes ACE, will reconsider based on kidney labs/urine.            Review of Systems   Constitutional: Negative.  Negative for chills and fever.   HENT:  Negative for ear pain and sore throat.    Eyes:  Negative for pain and visual disturbance.   Respiratory: Negative.  Negative for cough and shortness of breath.    Cardiovascular: Negative.  Negative for chest pain and palpitations.   Gastrointestinal: Negative.  Negative for abdominal pain and vomiting.   Genitourinary:  Negative for dysuria and hematuria.   Musculoskeletal:  Negative for arthralgias and back pain.   Skin:  Negative for color change and rash.   Neurological: Negative.  Negative for seizures and syncope.   All other systems reviewed and are negative.    Medical History Reviewed by provider this encounter:       Objective   {Disappearing Hyperlinks   Review Vitals * Enter New Vitals * Results Review * Labs * Imaging * Cardiology * Procedures * Lung Cancer Screening :23346}  /62 (BP Location: Left arm, Patient Position: Sitting, Cuff Size: Large)   Pulse 70   Ht 5' 1\" (1.549 m)   Wt 106 kg (234 lb)   SpO2 98%   BMI 44.21 kg/m²     Physical Exam  Vitals and nursing note reviewed.   Constitutional:       General: She is not in acute distress.     Appearance: Normal appearance. She is not ill-appearing.   HENT:      Head: Normocephalic and atraumatic.      Nose: Nose normal.   Eyes:      General:         Right eye: No discharge.         " Left eye: No discharge.      Conjunctiva/sclera: Conjunctivae normal.   Cardiovascular:      Rate and Rhythm: Normal rate and regular rhythm.      Heart sounds: Normal heart sounds.   Pulmonary:      Effort: Pulmonary effort is normal.      Breath sounds: Normal breath sounds.   Abdominal:      General: Bowel sounds are normal.      Palpations: Abdomen is soft.   Musculoskeletal:         General: Normal range of motion.      Cervical back: Normal range of motion.      Right lower leg: No edema.      Left lower leg: No edema.   Lymphadenopathy:      Cervical: No cervical adenopathy.   Skin:     General: Skin is warm and dry.   Neurological:      Mental Status: She is alert and oriented to person, place, and time.   Psychiatric:         Mood and Affect: Mood normal.         Behavior: Behavior normal.         Thought Content: Thought content normal.         Judgment: Judgment normal.       Administrative Statements {Disappearing Hyperlinks I  [facility] of Service * [facility]}  I have spent a total time of 23 minutes on 06/17/24 In caring for this patient including Patient and family education, Importance of tx compliance, Risk factor reductions, Impressions, Documenting in the medical record, and Obtaining or reviewing history  .

## 2024-06-18 DIAGNOSIS — Z12.31 SCREENING MAMMOGRAM FOR BREAST CANCER: Primary | ICD-10-CM

## 2024-06-18 LAB
ALBUMIN/CREAT UR: 28 MG/G CREAT
CREAT UR-MCNC: 188 MG/DL (ref 20–275)
MICROALBUMIN UR-MCNC: 5.2 MG/DL

## 2024-06-26 NOTE — PROGRESS NOTES
[facility]  Jennifer Ash, Pharmacist    Rosalia Rodriguez is a 44 y.o. female who was referred to the pharmacist for newly diagnosed Type 2 diabetes education and management, referred by MARCY Morales .    Telemedicine consent  The patient was identified by name and date of birth. Rosalia Rodriguez was informed that this is a telemedicine visit and that the visit is being conducted through Telephone.  My office door was closed. No one else was in the room.  She acknowledged consent and understanding of privacy and security of the video platform. The patient has agreed to participate and understands they can discontinue the visit at any time.    Assessment/ Plan     Type 2 Diabetes:  Goal A1c <6.5% individualized based on age and duration.  Last A1c  Lab Results   Component Value Date    HGBA1C 5.2 06/17/2024      Complications:  Microvascular complications: None  Macrovascular complications: None   Current Diabetes Regimen:  Mounjaro 10 mg weekly    Changes to Medication Regimen:   If PCP is in agreement with plan  Side effects have resolved. Continue Mounjaro 10 mg weekly.     2. Additional Therapies:  Statin: no - statin therapy is indicated.  ACEI/ARB: no- not indicated at this time; BP controleld   ASA: no- not indicated for primary prevention     3. Health Maintenance:  Eye Exam: up to date  Foot Exam: up to date  Urine Microalbumin: up to date    Follow-up: 12 weeks with clinical pharmacist     Subjective     DM Medication Adherence/ Tolerability:  Mounjaro 10 mg weekly - Side effects diarrhea and gas on 10 mg weekly dose when it was first increased. This has resolved as she is now more conscious of different foods that worsen side effects. She is now tolerating.      Medications Tried in Past:  Metformin 500 mg TID for PCOS. Did not tolerate- did not absorb medication.     2. Lifestyle:   Working with care management for diet and lifestyle adjustments.      3. Home monitoring devices  Glucometer: No  CGM: No; Previously used Freestyle Bryce 3  Objective       Lab Results   Component Value Date    HGBA1C 5.2 06/17/2024    HGBA1C 5.2 01/18/2024    HGBA1C 5.3 10/26/2023       Weight on Home scale:   3/2/23: 253 lbs  3/7/24: 229 lbs  5/6/24: 231 lbs    Labs:    Lab Results   Component Value Date    SODIUM 136 07/05/2023    K 4.1 07/05/2023    EGFR 111 07/05/2023    CREATININE 0.65 07/05/2023     Pharmacist Tracking Tool     Pharmacist Tracking Tool  Reason For Outreach: Embedded Pharmacist  Demographics:  Intervention Method: Phone  Type of Intervention: Follow-Up  Topics Addressed: Diabetes  Pharmacologic Interventions: N/A  Non-Pharmacologic Interventions: Disease state education and Medication/Device education  Time:  Direct Patient Care:  10  mins  Care Coordination:  5  mins  Recommendation Recipient: Patient/Caregiver and Provider  Outcome: Accepted

## 2024-06-27 ENCOUNTER — CLINICAL SUPPORT (OUTPATIENT)
Age: 44
End: 2024-06-27

## 2024-06-27 DIAGNOSIS — E11.9 TYPE 2 DIABETES MELLITUS WITHOUT COMPLICATION, WITH LONG-TERM CURRENT USE OF INSULIN (HCC): Primary | ICD-10-CM

## 2024-06-27 DIAGNOSIS — Z79.4 TYPE 2 DIABETES MELLITUS WITHOUT COMPLICATION, WITH LONG-TERM CURRENT USE OF INSULIN (HCC): Primary | ICD-10-CM

## 2024-07-03 ENCOUNTER — CLINICAL SUPPORT (OUTPATIENT)
Dept: FAMILY MEDICINE CLINIC | Facility: CLINIC | Age: 44
End: 2024-07-03

## 2024-07-03 DIAGNOSIS — Z79.4 TYPE 2 DIABETES MELLITUS WITHOUT COMPLICATION, WITH LONG-TERM CURRENT USE OF INSULIN (HCC): ICD-10-CM

## 2024-07-03 DIAGNOSIS — E11.9 TYPE 2 DIABETES MELLITUS WITHOUT COMPLICATION, WITH LONG-TERM CURRENT USE OF INSULIN (HCC): ICD-10-CM

## 2024-07-03 PROCEDURE — 36415 COLL VENOUS BLD VENIPUNCTURE: CPT

## 2024-07-04 LAB
ALBUMIN SERPL-MCNC: 4.4 G/DL (ref 3.6–5.1)
ALBUMIN/GLOB SERPL: 1.6 (CALC) (ref 1–2.5)
ALP SERPL-CCNC: 58 U/L (ref 31–125)
ALT SERPL-CCNC: 23 U/L (ref 6–29)
AST SERPL-CCNC: 20 U/L (ref 10–35)
BASOPHILS # BLD AUTO: 62 CELLS/UL (ref 0–200)
BASOPHILS NFR BLD AUTO: 0.9 %
BILIRUB SERPL-MCNC: 0.7 MG/DL (ref 0.2–1.2)
BUN SERPL-MCNC: 13 MG/DL (ref 7–25)
BUN/CREAT SERPL: NORMAL (CALC) (ref 6–22)
CALCIUM SERPL-MCNC: 9.6 MG/DL (ref 8.6–10.2)
CHLORIDE SERPL-SCNC: 103 MMOL/L (ref 98–110)
CHOLEST SERPL-MCNC: 182 MG/DL
CHOLEST/HDLC SERPL: 2.4 (CALC)
CO2 SERPL-SCNC: 24 MMOL/L (ref 20–32)
CREAT SERPL-MCNC: 0.68 MG/DL (ref 0.5–0.99)
EOSINOPHIL # BLD AUTO: 131 CELLS/UL (ref 15–500)
EOSINOPHIL NFR BLD AUTO: 1.9 %
ERYTHROCYTE [DISTWIDTH] IN BLOOD BY AUTOMATED COUNT: 13 % (ref 11–15)
GFR/BSA.PRED SERPLBLD CYS-BASED-ARV: 109 ML/MIN/1.73M2
GLOBULIN SER CALC-MCNC: 2.8 G/DL (CALC) (ref 1.9–3.7)
GLUCOSE SERPL-MCNC: 90 MG/DL (ref 65–99)
HCT VFR BLD AUTO: 42.1 % (ref 35–45)
HDLC SERPL-MCNC: 77 MG/DL
HGB BLD-MCNC: 14.1 G/DL (ref 11.7–15.5)
LDLC SERPL CALC-MCNC: 92 MG/DL (CALC)
LYMPHOCYTES # BLD AUTO: 1884 CELLS/UL (ref 850–3900)
LYMPHOCYTES NFR BLD AUTO: 27.3 %
MCH RBC QN AUTO: 28.9 PG (ref 27–33)
MCHC RBC AUTO-ENTMCNC: 33.5 G/DL (ref 32–36)
MCV RBC AUTO: 86.3 FL (ref 80–100)
MONOCYTES # BLD AUTO: 462 CELLS/UL (ref 200–950)
MONOCYTES NFR BLD AUTO: 6.7 %
NEUTROPHILS # BLD AUTO: 4361 CELLS/UL (ref 1500–7800)
NEUTROPHILS NFR BLD AUTO: 63.2 %
NONHDLC SERPL-MCNC: 105 MG/DL (CALC)
PLATELET # BLD AUTO: 268 THOUSAND/UL (ref 140–400)
PMV BLD REES-ECKER: 10.4 FL (ref 7.5–12.5)
POTASSIUM SERPL-SCNC: 4.5 MMOL/L (ref 3.5–5.3)
PROT SERPL-MCNC: 7.2 G/DL (ref 6.1–8.1)
RBC # BLD AUTO: 4.88 MILLION/UL (ref 3.8–5.1)
SODIUM SERPL-SCNC: 137 MMOL/L (ref 135–146)
TRIGL SERPL-MCNC: 51 MG/DL
WBC # BLD AUTO: 6.9 THOUSAND/UL (ref 3.8–10.8)

## 2024-08-15 ENCOUNTER — TELEPHONE (OUTPATIENT)
Dept: ADMINISTRATIVE | Facility: OTHER | Age: 44
End: 2024-08-15

## 2024-08-15 NOTE — TELEPHONE ENCOUNTER
----- Message from MARCY Klein sent at 8/15/2024 11:32 AM EDT -----  Regarding: Care gap update  08/15/24 11:32 AM    Hello, our patient Rosalia Rodriguez has had Mammogram completed/performed. Please assist in updating the patient chart by pulling the document from the Imaging/Procedure Tab. The date of service is 08/15/2024.     Thank you,  Pearl MARTINEZ [facility]

## 2024-08-15 NOTE — TELEPHONE ENCOUNTER
Upon review of the In Basket request we were able to locate, review, and update the patient chart as requested for Mammogram.    Any additional questions or concerns should be emailed to the Practice Liaisons via the appropriate education email address, please do not reply via In Basket.    Thank you  Taisha Banks MA   [facility]

## 2024-09-06 DIAGNOSIS — Z79.4 TYPE 2 DIABETES MELLITUS WITHOUT COMPLICATION, WITH LONG-TERM CURRENT USE OF INSULIN (HCC): ICD-10-CM

## 2024-09-06 DIAGNOSIS — E11.9 TYPE 2 DIABETES MELLITUS WITHOUT COMPLICATION, WITH LONG-TERM CURRENT USE OF INSULIN (HCC): ICD-10-CM

## 2024-09-11 ENCOUNTER — RA CDI HCC (OUTPATIENT)
Dept: OTHER | Facility: HOSPITAL | Age: 44
End: 2024-09-11

## 2024-09-12 ENCOUNTER — CLINICAL SUPPORT (OUTPATIENT)
Dept: FAMILY MEDICINE CLINIC | Facility: CLINIC | Age: 44
End: 2024-09-12

## 2024-09-12 DIAGNOSIS — Z79.4 TYPE 2 DIABETES MELLITUS WITHOUT COMPLICATION, WITH LONG-TERM CURRENT USE OF INSULIN (HCC): Primary | ICD-10-CM

## 2024-09-12 DIAGNOSIS — E11.9 TYPE 2 DIABETES MELLITUS WITHOUT COMPLICATION, WITH LONG-TERM CURRENT USE OF INSULIN (HCC): Primary | ICD-10-CM

## 2024-09-12 NOTE — ASSESSMENT & PLAN NOTE
Goal A1c <7% .   Lab Results   Component Value Date    HGBA1C 5.2 06/17/2024      Complications:  Microvascular complications:None identified at this time  Macrovascular complications: None identified at this time  Current Diabetes Regimen:  Mounjaro 10 mg weekly   Historical DM Meds (reason for discontinuation):  Metformin (did not tolerate), Ozempic (no side effects; switched to Mounjaro)  On Additional Therapies:  Statin: no- statin is indicated   ACEI/ARB: not indicated; BP controlled and no microalbuminuria on last urine chem    Assessment: A1c controlled at this time.  Patient is interested in increasing dose of Mounjaro to maximize weight loss benefit however would like to hold off on making any changes until next follow-up with her PCP.  She will continue Mounjaro 10 mg weekly until then.    Changes to medication regimen:  No changes today.  Continue Mounjaro 10 mg weekly.  I did call pharmacy and confirm that medication is ready to be picked up.  Discussed Statin medication today. Shared that diabetes increases risk for cardiovascular disease and that risk increases with age ago over 40. She declines statin at this time. Will continue to monitor cardiovascular risk factors.

## 2024-09-12 NOTE — PROGRESS NOTES
[facility] Clinical Pharmacy Services  Jennifer Ash, Pharmacist    Assessment/ Plan     Assessment & Plan  Type 2 diabetes mellitus without complication, with long-term current use of insulin ([facility])  Goal A1c <7% .   Lab Results   Component Value Date    HGBA1C 5.2 06/17/2024      Complications:  Microvascular complications:None identified at this time  Macrovascular complications: None identified at this time  Current Diabetes Regimen:  Mounjaro 10 mg weekly   Historical DM Meds (reason for discontinuation):  Metformin (did not tolerate), Ozempic (no side effects; switched to Mounjaro)  On Additional Therapies:  Statin: no- statin is indicated   ACEI/ARB: not indicated; BP controlled and no microalbuminuria on last urine chem    Assessment: A1c controlled at this time.  Patient is interested in increasing dose of Mounjaro to maximize weight loss benefit however would like to hold off on making any changes until next follow-up with her PCP.  She will continue Mounjaro 10 mg weekly until then.    Changes to medication regimen:  No changes today.  Continue Mounjaro 10 mg weekly.  I did call pharmacy and confirm that medication is ready to be picked up.  Discussed Statin medication today. Shared that diabetes increases risk for cardiovascular disease and that risk increases with age ago over 40. She declines statin at this time. Will continue to monitor cardiovascular risk factors.            Follow-up: 8 weeks    Subjective   HPI    Medication Adherence/ Tolerability/ Cost:  Patient denies side effects, no issues with cost,   ascorbic acid  loratadine  multivitamin  Tirzepatide 10 mg weekly- missed injection last Sunday due to pharmacy not having authorization approved.  I did reach out to  and confirmed that we did not receive a prior authorization request.  When I called pharmacy they said that it did not need an authorization and it was ready to be picked up    Review of Systems   Gastrointestinal:   Negative for abdominal pain, constipation, nausea, rectal pain and vomiting.        2. Lifestyle:     3. Home monitoring devices  Glucometer: No, Brand:   Continuous Glucose Monitor: No, Brand:      Objective       ASCVD Risk:  The 10-year ASCVD risk score (Hannah HICKS, et al., 2019) is: 0.9%    Values used to calculate the score:      Age: 44 years      Sex: Female      Is Non- : No      Diabetic: Yes      Tobacco smoker: No      Systolic Blood Pressure: 130 mmHg      Is BP treated: No      HDL Cholesterol: 77 mg/dL      Total Cholesterol: 182 mg/dL     Vitals:  There were no vitals filed for this visit.    Labs:    Lab Results   Component Value Date    SODIUM 137 07/03/2024    K 4.5 07/03/2024    EGFR 109 07/03/2024    CREATININE 0.68 07/03/2024         Telemedicine consent  The patient was identified by name and date of birth. Rosalia Rodriguez was informed that this is a telemedicine visit and that the visit is being conducted through Telephone.  My office door was closed. No one else was in the room.  She acknowledged consent and understanding of privacy and security of the video platform. The patient has agreed to participate and understands they can discontinue the visit at any time.    Pharmacist Tracking Tool     Pharmacist Tracking Tool  Reason For Outreach: Embedded Pharmacist  Demographics:  Intervention Method: Phone  Type of Intervention: Follow-Up  Topics Addressed: Diabetes  Pharmacologic Interventions: Medication Initiation  Non-Pharmacologic Interventions: Disease state education and Medication/Device education  Time:  Direct Patient Care:  15  mins  Care Coordination:  10  mins  Recommendation Recipient: Patient/Caregiver  Outcome: Declined - Patient

## 2024-11-06 ENCOUNTER — OFFICE VISIT (OUTPATIENT)
Dept: FAMILY MEDICINE CLINIC | Facility: CLINIC | Age: 44
End: 2024-11-06

## 2024-11-06 VITALS
HEIGHT: 61 IN | OXYGEN SATURATION: 97 % | HEART RATE: 70 BPM | DIASTOLIC BLOOD PRESSURE: 57 MMHG | WEIGHT: 231.6 LBS | BODY MASS INDEX: 43.73 KG/M2 | TEMPERATURE: 98.1 F | SYSTOLIC BLOOD PRESSURE: 119 MMHG

## 2024-11-06 DIAGNOSIS — R09.81 CONGESTION OF NASAL SINUS: ICD-10-CM

## 2024-11-06 DIAGNOSIS — R44.8 FACIAL PRESSURE: ICD-10-CM

## 2024-11-06 DIAGNOSIS — J02.9 SORE THROAT: ICD-10-CM

## 2024-11-06 DIAGNOSIS — R05.1 ACUTE COUGH: ICD-10-CM

## 2024-11-06 DIAGNOSIS — J01.90 ACUTE SINUSITIS, RECURRENCE NOT SPECIFIED, UNSPECIFIED LOCATION: Primary | ICD-10-CM

## 2024-11-06 PROCEDURE — 99213 OFFICE O/P EST LOW 20 MIN: CPT | Performed by: STUDENT IN AN ORGANIZED HEALTH CARE EDUCATION/TRAINING PROGRAM

## 2024-11-06 PROCEDURE — AMOXICILLIN/POT CLAV AMOXICILLIN/POT CLAV: Performed by: STUDENT IN AN ORGANIZED HEALTH CARE EDUCATION/TRAINING PROGRAM

## 2024-11-06 RX ORDER — AMOXICILLIN 875 MG/1
875 TABLET, COATED ORAL 2 TIMES DAILY
Qty: 14 TABLET | Refills: 0
Start: 2024-11-06 | End: 2024-11-06 | Stop reason: SDUPTHER

## 2024-11-06 NOTE — PROGRESS NOTES
Ambulatory Visit  Name: Rosalia Rodriguez      : 1980      MRN: [id number]  Encounter Provider: Bala Wilkerson DO  Encounter Date: 2024   Encounter department: [facility] IN PARTNERSHIP WITH [facility]    Assessment & Plan  Acute sinusitis, recurrence not specified, unspecified location    Orders:    amoxicillin-clavulanate (AUGMENTIN) 875-125 mg per tablet; Take 1 tablet by mouth every 12 (twelve) hours for 10 days    Acute cough    Orders:    amoxicillin-clavulanate (AUGMENTIN) 875-125 mg per tablet; Take 1 tablet by mouth every 12 (twelve) hours for 10 days    Congestion of nasal sinus    Orders:    amoxicillin-clavulanate (AUGMENTIN) 875-125 mg per tablet; Take 1 tablet by mouth every 12 (twelve) hours for 10 days    Facial pressure    Orders:    amoxicillin-clavulanate (AUGMENTIN) 875-125 mg per tablet; Take 1 tablet by mouth every 12 (twelve) hours for 10 days    Sore throat    Orders:    amoxicillin-clavulanate (AUGMENTIN) 875-125 mg per tablet; Take 1 tablet by mouth every 12 (twelve) hours for 10 days           Patient is a 44-year-old female presenting today with sinus pressure, congestion and facial pain.  Symptoms have been present for over a week.  She has used multiple over-the-counter medications without improvement in her symptoms.  The weather has been rough with the changing temperature throughout the day.  She does have a history of sinus infections.  I will treat her for acute sinusitis with Augmentin twice daily for 10 days.  Medication was given from our office supply.  Follow-up in office if not improved after treatment course.    Recommend supportive care with fluids, rest, flonase 1-2 sprays each nostril, otc claritin or zyrtec daily and mucinex as directed. Tylenol recommended prn for pain or fever.  Instructed pt RTO if symptoms persist or worsen over the course of the next week     History of Present Illness     HPI    History  "obtained from : patient  Review of Systems   Constitutional:  Negative for fever.   HENT:  Positive for congestion, postnasal drip, sinus pressure and sore throat.    Respiratory:  Positive for cough. Negative for shortness of breath.    Cardiovascular:  Negative for chest pain.   Gastrointestinal:  Negative for abdominal pain.   Skin:  Negative for rash.     Patient complains of congestion, cough, facial pain, and sinus pressure, sore throat. Onset of symptoms was 7 days ago. Symptoms have been gradually worsening since that time. She is drinking plenty of fluids.  Past history is significant for nothing. Patient is non-smoker.  Patient has used multiple over-the-counter medications without relief.      Objective     /57 (BP Location: Right arm, Patient Position: Sitting, Cuff Size: Large)   Pulse 70   Temp 98.1 °F (36.7 °C)   Ht 5' 1\" (1.549 m)   Wt 105 kg (231 lb 9.6 oz)   SpO2 97%   BMI 43.76 kg/m²     Physical Exam  Vitals and nursing note reviewed.   Constitutional:       General: She is not in acute distress.     Appearance: Normal appearance. She is well-developed. She is obese.   HENT:      Head: Normocephalic and atraumatic.      Right Ear: Tympanic membrane, ear canal and external ear normal. Tympanic membrane is not erythematous.      Left Ear: Tympanic membrane, ear canal and external ear normal. Tympanic membrane is not erythematous.      Nose: Congestion present.      Comments: Sinus pressure on palpation     Mouth/Throat:      Mouth: Mucous membranes are moist.      Pharynx: No oropharyngeal exudate or posterior oropharyngeal erythema.      Comments: Postnasal drip  Eyes:      Conjunctiva/sclera: Conjunctivae normal.   Cardiovascular:      Rate and Rhythm: Normal rate and regular rhythm.      Heart sounds: Normal heart sounds. No murmur heard.  Pulmonary:      Effort: Pulmonary effort is normal. No respiratory distress.      Breath sounds: Normal breath sounds. No wheezing.   Skin:     " General: Skin is warm and dry.      Findings: No rash.   Neurological:      General: No focal deficit present.      Mental Status: She is alert and oriented to person, place, and time. Mental status is at baseline.   Psychiatric:         Mood and Affect: Mood normal.         Behavior: Behavior normal.         Thought Content: Thought content normal.         Judgment: Judgment normal.       Administrative Statements   I have spent a total time of 20 minutes in caring for this patient on the day of the visit/encounter including Instructions for management, Documenting in the medical record, and Obtaining or reviewing history  .

## 2024-11-14 ENCOUNTER — OFFICE VISIT (OUTPATIENT)
Dept: FAMILY MEDICINE CLINIC | Facility: CLINIC | Age: 44
End: 2024-11-14

## 2024-11-14 VITALS
BODY MASS INDEX: 43.61 KG/M2 | HEART RATE: 78 BPM | DIASTOLIC BLOOD PRESSURE: 72 MMHG | OXYGEN SATURATION: 98 % | WEIGHT: 231 LBS | HEIGHT: 61 IN | SYSTOLIC BLOOD PRESSURE: 106 MMHG

## 2024-11-14 DIAGNOSIS — Z79.4 TYPE 2 DIABETES MELLITUS WITHOUT COMPLICATION, WITH LONG-TERM CURRENT USE OF INSULIN (HCC): Primary | ICD-10-CM

## 2024-11-14 DIAGNOSIS — E11.9 TYPE 2 DIABETES MELLITUS WITHOUT COMPLICATION, WITH LONG-TERM CURRENT USE OF INSULIN (HCC): Primary | ICD-10-CM

## 2024-11-14 LAB — SL AMB POCT HEMOGLOBIN AIC: 5.3 (ref ?–6.5)

## 2024-11-14 PROCEDURE — 83036 HEMOGLOBIN GLYCOSYLATED A1C: CPT | Performed by: NURSE PRACTITIONER

## 2024-11-14 PROCEDURE — 99214 OFFICE O/P EST MOD 30 MIN: CPT | Performed by: NURSE PRACTITIONER

## 2024-11-14 NOTE — PATIENT INSTRUCTIONS
"Patient Education     Carb counting for adults with diabetes   The Basics   Written by the doctors and editors at [facility]   What is carb counting? -- This is a type of meal planning that many people with diabetes use. It is a way to figure out how many carbohydrates, or \"carbs,\" you eat.  The body breaks down the food we eat into 3 main types of nutrients: carbs, proteins, and fats. Carbs are sugars and starches that come from food. The body uses carbs for energy.  Why do I need to count carbs? -- People with diabetes need to pay attention to how many carbs they eat. This is because carbs raise your blood sugar level.  Carb counting helps you:   Choose the right amount of insulin to take before meals and snacks - If you take insulin before meals, the dose depends on several things, including how many carbs you plan to eat. (It also depends on how much you plan to exercise and your blood sugar level.)   Plan your meals and snacks for the day - You can use carb counting to figure out how many carbs to eat at each meal and snack. This helps you make sure that you eat the right amount over the entire day.   Keep your blood sugar levels well managed - Spreading out the carbs you eat over a whole day can help keep your blood sugar from getting too high. If you take insulin or another diabetes medicine that can cause low blood sugar, eating about the same amount of carbs at each meal every day also helps keep your blood sugar from getting too low. Reducing the amount of carbs you eat can help you manage your diabetes better and prevent medical problems that diabetes can cause.  Your doctor, nurse, or dietitian (food expert) can help you figure out how many carbs to try to eat each day. This will depend on your eating habits, weight, activity level, and which diabetes medicines you take.  People who take insulin before meals might need to be very careful when they count the carbs in every meal and snack. This is so they " "can give themselves the right amount of insulin. If the insulin dose doesn't match the amount of carbs, their blood sugar might get too low or too high. Other people might be able to be a little more flexible as long as they get about the same amount of carbs at each meal or throughout the day.  Which foods have carbs? -- Foods with a lot of carbs include:   Grains - These include bread, pasta, rice, and cereal.   Fruits and starchy vegetables - Starchy vegetables include potatoes, corn, and squash.   Milk and other dairy products - Dairy products include cheese and yogurt.   Foods with added sugar - These include sweets and baked goods likes cookies and cakes, as well as sugary drinks like juice and soda.  It is best to get most of your carbs from fruits, vegetables, whole grains (like whole-wheat bread, whole-grain cereals, and brown rice), and low-fat milk and dairy products.  How do I count carbs? -- To count carbs in packaged foods, check the food's nutrition label (if it has one).  On the label (figure 1), check for:   \"Total Carbohydrate\" number - This tells you how many carbs are in 1 serving size of the food. If you eat 1 serving, then the number of carbs you eat is the same as the number of total carbohydrates.   \"Serving size\" - This tells you how much food is in 1 serving. If you have 2 servings, the number of carbs will be 2 times the number of carbohydrates listed.   \"Dietary Fiber\" - Fiber is a carb that is not digested, which means that it does not raise blood sugar. Foods with a lot of fiber can help manage your blood sugar. If a food has more than 5 grams (g) of fiber, you need less insulin to cover the total carbs in that food. So, if you are calculating an insulin dose, only count the carbs that are not from fiber (figure 1).  What is exchange planning? -- Exchange planning, or the \"exchange system,\" is a way for people to plan their meals without reading labels. This can be helpful since many " "foods don't come with a nutrition label.  The exchange system involves knowing how much of different foods have about 15 grams of carbs (table 1 and table 2 and table 3). Your doctor, nurse, or dietitian gives you a certain number of \"carb choices\" to eat with each meal and snack (table 4). Each \"choice\" is a portion of food that has about 15 grams of carbs. Knowing your options makes it easier to \"exchange\" 1 carb choice for another as you plan your meals and snacks. For example, 1 small apple could be exchanged for 1/3 cup of pasta.  How can I plan my meals? -- First, make sure that you know how many carbs you should be eating each day. Ask your doctor, nurse, or dietitian if you are not sure.  Here are some tips that might help:   Spread out your carbs over 4 to 6 small meals each day instead of 3 big ones.   Eat a similar number of carbs at each meal, for example, at each dinner.   Eat your meals at a similar time each day.   Plan your meals ahead of time.   Use the \"plate method.\" This is a simpler way to make sure that you get a good balance of carbs and other nutrients with each meal. It is not as exact as counting all of your carbs, but it can be helpful for people who prefer a simpler approach. If you take insulin before meals, it is generally better to adjust your insulin dose by counting how many carbs you plan to eat or using the exchange planning strategy.  For the plate method, you start with a plate about 9 inches (23 cm) across. Fill it with (figure 2):   1/2 non-starchy vegetables   1/4 protein   1/4 carbs   Follow your doctor's instructions for how and when to check your blood sugar. This can help you learn how certain foods affect your blood sugar.   Keep track of your meals and blood sugar levels. Show this to your doctor or nurse so they can adjust your treatment if needed. If you take insulin, you will also need to keep track of your exercise patterns and how much insulin you give yourself with " "each dose.   If you take insulin, make sure that you understand how to use it. This includes knowing how to adjust the dose based on your blood sugar level and what you plan to eat. Foods that have a lot of protein or fat also can affect your blood sugar level. Some people need to adjust their insulin doses when they eat these foods.   Remember that other things besides carbs can raise or lower your blood sugar level. These things can include exercise, getting sick, drinking alcohol, traveling, and stress. If you take insulin, make sure that you know how and when to adjust your dose in these situations.  If you are having trouble counting carbs or managing your blood sugar, talk to your doctor or nurse. They can help. A dietitian can also help you plan specific menus that will give you the right amount of carbs each day.  For more information, you can also get a book on counting carbs or check the American Diabetes Association website (www.diabetes.org).  All topics are updated as new evidence becomes available and our peer review process is complete.  This topic retrieved from [employer] on: Mar 27, 2024.  Topic 55564 Version 11.0  Release: 32.2.4 - C32.85  © 2024 UpToDate, Inc. and/or its affiliates. All rights reserved.  figure 1: Counting carbohydrates     To figure out the \"carb count\" in 1 serving, start with the number of grams of total carbohydrates (46 grams), then subtract the number of grams of dietary fiber (7 grams). It's also important to look at the serving size. In this example, the carb count is 39 grams. You can use this number when counting carbs for your insulin dose.  Graphic 61667 Version 8.0  table 1: Bread and grains with 15 grams of carbs*  Bread    Food  Serving size    Bagel 1/4 large bagel (1 oz)   Biscuit 1 biscuit (2.5 inches across)   Bread, reduced calorie, light 2 slices (1.5 oz)   Cornbread 1.75 inch cube (1.5 oz)   English muffin 1/2 muffin   Hot dog or hamburger bun 1/2 bun (3/4 oz) " "  Naan, chapati, or roti 1 oz   Pancake 1 pancake (4 inches across, 1/4 inch thick)   Evelyn (6 inches across) 1/2 evelyn   Tortilla, corn 1 small tortilla (6 inches across)   Tortilla, flour (white or whole wheat) 1 small tortilla (6 inches across) or 1/3 large tortilla (10 inches across)   Waffle 1 waffle (4-inch square or 4 inches across)   Cereals and grains (including pasta and rice)    Food  Serving size (cooked)    Barley, couscous, millet, pasta (white or whole wheat, all shapes and sizes), polenta, quinoa (all colors), or rice (white, brown, and other colors and types) 1/3 cup   Bran cereal (twigs, buds, or flakes), shredded wheat (plain), or sugar-coated cereal 1/2 cup   Bulgur, kasha, tabbouleh (tabouli), or wild rice 1/2 cup   Granola cereal 1/4 cup   Hot cereal (oats, oatmeal, grits) 1/2 cup   Unsweetened, ready-to-eat cereal 3/4 cup   * For bread and grains, 15 grams of carbs is considered 1 serving or \"choice\" for people who need to count carbs.  Graphic 401895 Version 1.0  table 2: Fruits with 15 grams of carbs*  Food  Serving size    Applesauce, unsweetened 1/2 cup   Banana 1 extra small banana, about 4 inches long (4 oz)   Blueberries 3/4 cup   Dried fruits (blueberries, cherries, cranberries, mixed fruit, raisins) 2 tbsp   Fruit, canned 1/2 cup   Fruit, whole, small (apple) 1 small fruit (4 oz)   Fruit, whole, medium (nectarine, orange, pear, tangerine) 1 medium fruit (6 oz)   Fruit juice, unsweetened 1/2 cup   Grapes 17 small grapes (3 oz)   Melon, diced 1 cup   Strawberries, whole 1 and 1/4 cups   When listed, weight (oz) includes skin and seeds. If you are not sure if your fruit is the right size for 1 serving, you can use a food scale to check the weight.  * For fruits, 15 grams of carbs is considered 1 serving or \"choice\" for people who need to count carbs.  Graphic 656111 Version 1.0  table 3: Starchy vegetables with 15 grams of carbs*  Food  Serving size (cooked)    Cassava, dasheen, or " "plantain 1/3 cup   Corn, green peas, mixed vegetables, or parsnips 1/2 cup   Marinara, pasta, or spaghetti sauce 1/2 cup   Mixed vegetables (with corn or peas) 1 cup   Potato, baked with skin 1/4 large (3 oz)   Potato, Welsh-fried (oven-baked) 1 cup (2 oz)   Potato, mashed with milk and fat 1/2 cup   Squash, winter (acorn, butternut) 1 cup   Yam or sweet potato, plain 1/2 cup (3 and 1/2 oz)   If you are not sure if your vegetable is the right size for 1 serving, you can use a food scale to check the weight.  * For starchy vegetables, 15 grams of carbs is considered 1 serving or \"choice\" for people who need to count carbs.  Graphic 031525 Version 1.0  table 4: Sample exchange system meal plan  Time  Exchange pattern  Sample menu  Carbohydrate count (g)    8 am 3 carbohydrate group    2 starch 1 English muffin 30    1 fruit 1 1/4 c strawberries 15    1 protein group 1/4 c cottage cheese -    1 fat group 1 tsp margarine -      Total: 45    12 noon 4 carbohydrate group    2 starch 2 slices of bread 30    1 fruit 1 orange 15    1 vegetable 1 c salad -    1 milk 8 oz skim milk 12    3 protein group 3 oz chicken -    1 fat group 1 tbsp low fat hernandez -      Total: 57    3 pm 1 carbohydrate group    1 fruit or 1 starch 1 apple or 6 crackers 15      Total: 15    6 pm 4 carbohydrate group    2 starch 1 c potato 30    1 fruit 1/2 c fruit salad 15    1 vegetable 1 c salad -    1 milk 8 oz skim milk 12    6 protein group 6 oz fish -    1 fat group 2 tbsp low fat salad dressing -      Total: 57    9 pm 1 carbohydrate group    1 starch 6 crackers 15    1 protein 2 tbsp peanut butter -      Total: 15    Graphic 40365 Version 3.0  figure 2: The \"plate method\"     For the plate method, you start with a plate about 9 inches (23 cm) across. Then fill it with 1/2 non-starchy vegetables, 1/4 protein, and 1/4 carbs.  Graphic 565533 Version 2.0  Consumer Information Use and Disclaimer   Disclaimer: This generalized information is a limited " summary of diagnosis, treatment, and/or medication information. It is not meant to be comprehensive and should be used as a tool to help the user understand and/or assess potential diagnostic and treatment options. It does NOT include all information about conditions, treatments, medications, side effects, or risks that may apply to a specific patient. It is not intended to be medical advice or a substitute for the medical advice, diagnosis, or treatment of a health care provider based on the health care provider's examination and assessment of a patient's specific and unique circumstances. Patients must speak with a health care provider for complete information about their health, medical questions, and treatment options, including any risks or benefits regarding use of medications. This information does not endorse any treatments or medications as safe, effective, or approved for treating a specific patient. UpToDate, Inc. and its affiliates disclaim any warranty or liability relating to this information or the use thereof.The use of this information is governed by the Terms of Use, available at https://www.wolterskluwer.com/en/know/clinical-effectiveness-terms. 2024© UpToDate, Inc. and its affiliates and/or licensors. All rights reserved.  Copyright   © 2024 UpToDate, Inc. and/or its affiliates. All rights reserved.

## 2024-11-14 NOTE — PROGRESS NOTES
"Name: Rosalia Rodriguez      : 1980      MRN: [id number]  Encounter Provider: MARCY Morales  Encounter Date: 2024   Encounter department[facility]  :  Assessment & Plan  Type 2 diabetes mellitus without complication, with long-term current use of insulin (HCC)  A1C well controlled. Pt tolerating medication well. Encourage continued diabetic diet and exercise. F/U 4 months or sooner as needed.   Lab Results   Component Value Date    HGBA1C 5.3 2024       Orders:    POCT hemoglobin A1c    Tirzepatide 10 MG/0.5ML SOAJ; Inject 10 mg under the skin every 7 days    Adult BMI 40.0-44.9 kg/sq m ([facility])  Goal to consume 500 to 1,000 fewer calories per day for a 1-2 lbs weight loss per week. Increase exercise to 30 min, 5 times a week as tolerated for a goal of 150 mins a week. Calorie tracking apps such as myfitness pal can be helpful for keeping track of calorie intake. Decrease simple carbohydrates (white bread, pasta, white rice), and increasing vegetables, fruit, and protein.  Increase water.                History of Present Illness     Rosalia Rodriguez is a 44 y.o. female who presents for follow-up of Type 2 diabetes mellitus.  Current symptoms/problems include none and have been stable.   Known diabetic complications: none  Cardiovascular risk factors: diabetes mellitus, dyslipidemia, and obesity (BMI >= 30 kg/m2)  Current diabetic medications include Mounjaro.   Eye exam current (within one year): yes  Weight trend: stable  Prior visit with dietician: yes -   Current diet: in general, a \"healthy\" diet    Current exercise: walking     Current monitoring regimen: none        Is She on ACE inhibitor or angiotensin II receptor blocker?   She declines due to low blood pressure when she takes ACE, will reconsider based on kidney labs/urine.          Review of Systems   Constitutional: Negative.  Negative for chills and " "fever.   HENT: Negative.  Negative for ear pain and sore throat.    Eyes:  Negative for pain and visual disturbance.   Respiratory: Negative.  Negative for cough and shortness of breath.    Cardiovascular: Negative.  Negative for chest pain and palpitations.   Gastrointestinal: Negative.  Negative for abdominal pain and vomiting.   Genitourinary: Negative.  Negative for dysuria and hematuria.   Musculoskeletal:  Negative for arthralgias and back pain.   Skin:  Negative for color change and rash.   Neurological:  Negative for seizures and syncope.   All other systems reviewed and are negative.         Objective   /72 (BP Location: Left arm, Patient Position: Sitting, Cuff Size: Large)   Pulse 78   Ht 5' 1\" (1.549 m)   Wt 105 kg (231 lb)   SpO2 98%   BMI 43.65 kg/m²      Physical Exam  Vitals and nursing note reviewed.   Constitutional:       General: She is not in acute distress.     Appearance: Normal appearance. She is not ill-appearing.   HENT:      Head: Normocephalic and atraumatic.      Nose: Nose normal.   Eyes:      General:         Right eye: No discharge.         Left eye: No discharge.      Conjunctiva/sclera: Conjunctivae normal.   Cardiovascular:      Rate and Rhythm: Normal rate and regular rhythm.      Heart sounds: Normal heart sounds.   Pulmonary:      Effort: Pulmonary effort is normal.      Breath sounds: Normal breath sounds.   Abdominal:      General: Bowel sounds are normal.      Palpations: Abdomen is soft.   Musculoskeletal:         General: Normal range of motion.      Cervical back: Normal range of motion.      Right lower leg: No edema.      Left lower leg: No edema.   Lymphadenopathy:      Cervical: No cervical adenopathy.   Skin:     General: Skin is warm and dry.   Neurological:      Mental Status: She is alert and oriented to person, place, and time.   Psychiatric:         Mood and Affect: Mood normal.         Behavior: Behavior normal.         Thought Content: Thought " content normal.         Judgment: Judgment normal.       Administrative Statements   I have spent a total time of 36 minutes in caring for this patient on the day of the visit/encounter including Instructions for management, Patient and family education, Risk factor reductions, Counseling / Coordination of care, and Documenting in the medical record.

## 2024-11-14 NOTE — ASSESSMENT & PLAN NOTE
A1C well controlled. Pt tolerating medication well. Encourage continued diabetic diet and exercise. F/U 4 months or sooner as needed.   Lab Results   Component Value Date    HGBA1C 5.3 11/14/2024       Orders:    POCT hemoglobin A1c    Tirzepatide 10 MG/0.5ML SOAJ; Inject 10 mg under the skin every 7 days

## 2024-11-21 ENCOUNTER — CLINICAL SUPPORT (OUTPATIENT)
Dept: FAMILY MEDICINE CLINIC | Facility: CLINIC | Age: 44
End: 2024-11-21

## 2024-11-21 DIAGNOSIS — E11.9 TYPE 2 DIABETES MELLITUS WITHOUT COMPLICATION, WITH LONG-TERM CURRENT USE OF INSULIN (HCC): Primary | ICD-10-CM

## 2024-11-21 DIAGNOSIS — Z79.4 TYPE 2 DIABETES MELLITUS WITHOUT COMPLICATION, WITH LONG-TERM CURRENT USE OF INSULIN (HCC): Primary | ICD-10-CM

## 2024-11-21 PROCEDURE — PBNCHG PB NO CHARGE PLACEHOLDER: Performed by: PHARMACIST

## 2024-11-25 NOTE — ASSESSMENT & PLAN NOTE
Lab Results   Component Value Date    HGBA1C 5.3 11/14/2024   Goal A1c <7%   Complications:  Microvascular complications:None identified at this time  Macrovascular complications: None identified at this time  Current Diabetes Regimen:  Mounjaro 10 mg weekly   Historical DM Meds (reason for discontinuation):  Metformin (did not tolerate), Ozempic (no side effects; switched to Mounjaro)  On Additional Therapies:  Statin: no- statin is indicated   ACEI/ARB: not indicated; BP controlled and no microalbuminuria on last urine chem     Assessment: A1c controlled at this time.      Changes to medication regimen:  No changes today.

## 2024-11-25 NOTE — PROGRESS NOTES
[facility] Clinical Pharmacy Services  Jennifer Ash, Pharmacist    Assessment/ Plan     Assessment & Plan  Type 2 diabetes mellitus without complication, with long-term current use of insulin ([facility])    Lab Results   Component Value Date    HGBA1C 5.3 11/14/2024   Goal A1c <7%   Complications:  Microvascular complications:None identified at this time  Macrovascular complications: None identified at this time  Current Diabetes Regimen:  Mounjaro 10 mg weekly   Historical DM Meds (reason for discontinuation):  Metformin (did not tolerate), Ozempic (no side effects; switched to Mounjaro)  On Additional Therapies:  Statin: no- statin is indicated   ACEI/ARB: not indicated; BP controlled and no microalbuminuria on last urine chem     Assessment: A1c controlled at this time.      Changes to medication regimen:  No changes today.           Follow-up:as needed with clinical pharmacist     Subjective   HPI    Medication Adherence/ Tolerability/ Cost:  Tirzepatide 10 mg weekly      2. Lifestyle:     3. Home monitoring devices  Glucometer: No, Brand:   Continuous Glucose Monitor: No, Brand:      Objective       ASCVD Risk:  The 10-year ASCVD risk score (Hannah HICKS, et al., 2019) is: 0.6%    Values used to calculate the score:      Age: 44 years      Sex: Female      Is Non- : No      Diabetic: Yes      Tobacco smoker: No      Systolic Blood Pressure: 106 mmHg      Is BP treated: No      HDL Cholesterol: 77 mg/dL      Total Cholesterol: 182 mg/dL     Vitals:  There were no vitals filed for this visit.    Labs:    Lab Results   Component Value Date    SODIUM 137 07/03/2024    K 4.5 07/03/2024    EGFR 109 07/03/2024    CREATININE 0.68 07/03/2024       Pharmacist Tracking Tool     Pharmacist Tracking Tool  Reason For Outreach: Embedded Pharmacist  Demographics:  Intervention Method: Chart Review  Type of Intervention: Follow-Up  Topics Addressed: Diabetes  Pharmacologic Interventions: N/A  Non-Pharmacologic  Interventions: N/A  Time:  Direct Patient Care:  5  mins  Care Coordination:  10  mins  Recommendation Recipient: N/A  Outcome: N/A

## 2024-12-07 DIAGNOSIS — Z79.4 TYPE 2 DIABETES MELLITUS WITHOUT COMPLICATION, WITH LONG-TERM CURRENT USE OF INSULIN (HCC): ICD-10-CM

## 2024-12-07 DIAGNOSIS — E11.9 TYPE 2 DIABETES MELLITUS WITHOUT COMPLICATION, WITH LONG-TERM CURRENT USE OF INSULIN (HCC): ICD-10-CM

## 2024-12-07 RX ORDER — TIRZEPATIDE 10 MG/.5ML
INJECTION, SOLUTION SUBCUTANEOUS
Qty: 2 ML | Refills: 5 | Status: SHIPPED | OUTPATIENT
Start: 2024-12-07

## 2025-01-16 ENCOUNTER — OFFICE VISIT (OUTPATIENT)
Dept: FAMILY MEDICINE CLINIC | Facility: CLINIC | Age: 45
End: 2025-01-16

## 2025-01-16 VITALS
WEIGHT: 233 LBS | HEART RATE: 77 BPM | OXYGEN SATURATION: 98 % | BODY MASS INDEX: 43.99 KG/M2 | TEMPERATURE: 97.9 F | HEIGHT: 61 IN | DIASTOLIC BLOOD PRESSURE: 70 MMHG | RESPIRATION RATE: 18 BRPM | SYSTOLIC BLOOD PRESSURE: 104 MMHG

## 2025-01-16 DIAGNOSIS — U07.1 COVID: Primary | ICD-10-CM

## 2025-01-16 DIAGNOSIS — R52 GENERALIZED BODY ACHES: ICD-10-CM

## 2025-01-16 DIAGNOSIS — E11.9 TYPE 2 DIABETES MELLITUS WITHOUT COMPLICATION, WITH LONG-TERM CURRENT USE OF INSULIN (HCC): ICD-10-CM

## 2025-01-16 DIAGNOSIS — Z79.4 TYPE 2 DIABETES MELLITUS WITHOUT COMPLICATION, WITH LONG-TERM CURRENT USE OF INSULIN (HCC): ICD-10-CM

## 2025-01-16 LAB
SARS-COV-2 AG UPPER RESP QL IA: POSITIVE
SL AMB POCT RAPID FLU A: NORMAL
SL AMB POCT RAPID FLU B: NORMAL
VALID CONTROL: ABNORMAL

## 2025-01-16 PROCEDURE — 87811 SARS-COV-2 COVID19 W/OPTIC: CPT | Performed by: NURSE PRACTITIONER

## 2025-01-16 PROCEDURE — 87804 INFLUENZA ASSAY W/OPTIC: CPT | Performed by: NURSE PRACTITIONER

## 2025-01-16 PROCEDURE — 99213 OFFICE O/P EST LOW 20 MIN: CPT | Performed by: NURSE PRACTITIONER

## 2025-01-16 RX ORDER — NIRMATRELVIR AND RITONAVIR 300-100 MG
3 KIT ORAL 2 TIMES DAILY
Qty: 30 TABLET | Refills: 0 | Status: SHIPPED | OUTPATIENT
Start: 2025-01-16 | End: 2025-01-21

## 2025-01-16 NOTE — ASSESSMENT & PLAN NOTE
We dicussed SE and use of Paxlovid. Pt agreeable to starting Paxlovid. Pt advised on Vit D, Vit C, Zinc and sx care. We discussed masking for 5 days. Discussed red flags which warrant more emergent F/U.   Orders:  •  nirmatrelvir & ritonavir (Paxlovid, 300/100,) tablet therapy pack; Take 3 tablets by mouth 2 (two) times a day for 5 days Take 2 nirmatrelvir tablets + 1 ritonavir tablet together per dose  •  POCT rapid flu A and B  •  POCT Rapid Covid Ag

## 2025-01-16 NOTE — PROGRESS NOTES
Name: Rosalia Rodriguez      : 1980      MRN: [id number]  Encounter Provider: MARCY Morales  Encounter Date: 2025   Encounter department: [facility] IN PARTNERSHIP WITH [facility]  :  Assessment & Plan  COVID  We dicussed SE and use of Paxlovid. Pt agreeable to starting Paxlovid. Pt advised on Vit D, Vit C, Zinc and sx care. We discussed masking for 5 days. Discussed red flags which warrant more emergent F/U.   Orders:  •  nirmatrelvir & ritonavir (Paxlovid, 300/100,) tablet therapy pack; Take 3 tablets by mouth 2 (two) times a day for 5 days Take 2 nirmatrelvir tablets + 1 ritonavir tablet together per dose  •  POCT rapid flu A and B  •  POCT Rapid Covid Ag    Type 2 diabetes mellitus without complication, with long-term current use of insulin (HCC)  Continue on medication as ordered. Pt following CCN and pharmacist.   Lab Results   Component Value Date    HGBA1C 5.3 2024          Generalized body aches  Covid positive.   Orders:  •  POCT rapid flu A and B  •  POCT Rapid Covid Ag        BMI Counseling: Body mass index is 44.02 kg/m². The BMI is above normal. Nutrition recommendations include decreasing portion sizes and encouraging healthy choices of fruits and vegetables. Exercise recommendations include moderate physical activity 150 minutes/week. Rationale for BMI follow-up plan is due to patient being overweight or obese.       History of Present Illness     Hoarse voice started one week ago, along with dry cough. Yesterday reports she had body aches and sinus pressure, ear pressure.   She denies Fever, N/V/D. She took Mucinex and sinus medication    Sinus Problem  This is a new problem. The current episode started in the past 7 days. The problem is unchanged. There has been no fever. Associated symptoms include congestion, coughing, a hoarse voice and sinus pressure. Pertinent negatives include no chills, diaphoresis, ear pain,  "headaches, neck pain, shortness of breath, sneezing, sore throat or swollen glands. Treatments tried: Mucinex and sudafed. The treatment provided mild relief.     Review of Systems   Constitutional: Negative.  Negative for activity change, appetite change, chills, diaphoresis, fatigue, fever and unexpected weight change.   HENT:  Positive for congestion, hoarse voice, postnasal drip, sinus pressure and sinus pain. Negative for ear pain, sneezing and sore throat.    Respiratory:  Positive for cough. Negative for apnea, choking, chest tightness, shortness of breath, wheezing and stridor.    Musculoskeletal:  Negative for neck pain.   Neurological:  Negative for headaches.       Objective   /70 (BP Location: Left arm, Patient Position: Sitting, Cuff Size: Large)   Pulse 77   Temp 97.9 °F (36.6 °C) (Temporal)   Resp 18   Ht 5' 1\" (1.549 m)   Wt 106 kg (233 lb)   SpO2 98%   BMI 44.02 kg/m²      Physical Exam  Vitals and nursing note reviewed.   Constitutional:       General: She is not in acute distress.     Appearance: She is well-developed. She is ill-appearing.   HENT:      Head: Normocephalic and atraumatic.      Right Ear: Tympanic membrane, ear canal and external ear normal. There is no impacted cerumen.      Left Ear: Tympanic membrane, ear canal and external ear normal. There is no impacted cerumen.      Nose: Nose normal. No congestion or rhinorrhea.      Mouth/Throat:      Mouth: Mucous membranes are moist.      Pharynx: Oropharynx is clear. No oropharyngeal exudate or posterior oropharyngeal erythema.   Eyes:      Conjunctiva/sclera: Conjunctivae normal.   Cardiovascular:      Rate and Rhythm: Normal rate and regular rhythm.      Heart sounds: No murmur heard.  Pulmonary:      Effort: Pulmonary effort is normal. No respiratory distress.      Breath sounds: Normal breath sounds. No stridor. No wheezing, rhonchi or rales.   Chest:      Chest wall: No tenderness.   Abdominal:      General: Bowel " sounds are normal.      Palpations: Abdomen is soft.      Tenderness: There is no abdominal tenderness.   Musculoskeletal:         General: No swelling.      Cervical back: Neck supple.   Skin:     General: Skin is warm and dry.      Capillary Refill: Capillary refill takes less than 2 seconds.   Neurological:      Mental Status: She is alert.   Psychiatric:         Mood and Affect: Mood normal.       Administrative Statements   I have spent a total time of 23 minutes in caring for this patient on the day of the visit/encounter including Instructions for management, Patient and family education, Importance of tx compliance, Documenting in the medical record, Reviewing / ordering tests, medicine, procedures  , and Obtaining or reviewing history  .

## 2025-01-16 NOTE — PATIENT INSTRUCTIONS
"Patient Education     COVID-19 in adults - Discharge instructions   The Basics   Written by the doctors and editors at [facility]   What are discharge instructions? -- Discharge instructions are information about how to take care of yourself after getting medical care for a health problem.  What is COVID-19? -- COVID-19 stands for \"coronavirus disease 2019.\" It is caused by a virus called SARS-CoV-2.  The virus that causes COVID-19 mainly spreads from person to person. This usually happens when an infected person coughs, sneezes, or talks near other people. A person can be infected, and spread the virus to others, even without having any symptoms.  How do I care for myself at home? -- Ask the doctor or nurse what you should do when you go home. Make sure that you understand exactly what you need to do to care for yourself. Ask questions if there is anything you do not understand.  You can also:   Follow all instructions for taking your medicines, if your doctor prescribed any.   Drink plenty of fluids, such as water, juice, or broth. This helps replace fluids lost from a fever.   Take acetaminophen (sample brand name: Tylenol) to help reduce a fever. If this does not help, you can try medicines like ibuprofen (sample brand names: Advil, Motrin).   Use a cool mist humidifier. This might make it easier to breathe.   Lower the chance of passing the infection to others:   Stay home while you are feeling sick or have a fever.   At home, try to limit close contact with other people. You can also help protect others by wearing a face mask.   Wash your hands often (figure 1).   Cover your mouth and nose with the inside of your elbow when you cough or sneeze.   Do not go to work or school until your symptoms are improving and your fever has been gone for at least 24 hours without taking medicine such as acetaminophen.  If you have not already been vaccinated, consider getting a COVID-19 vaccine as soon as you have recovered. " Being vaccinated is the best way to protect yourself and others.  When should I call the doctor? -- Call for an ambulance (in the [place] and [place], call 9-1-1) if:   You are having so much trouble breathing that you cannot speak a full sentence.   You are very confused or cannot stay awake.   Your lips or skin start to turn blue.   You think that you might be having a medical emergency - Examples include severe chest pain, feeling extremely weak or like you might pass out, or losing control of your body (like being unable to speak normally or move your arm or leg).  Call your doctor if:   You develop new shortness of breath, or your breathing gets worse (but you can still talk in full sentences).   You become weak or dizzy.   You have very dark urine or do not urinate for more than 8 hours.   You have new or worsening symptoms that concern you - COVID-19 symptoms can include fever, cough, feeling very tired, shaking, chills, headache, and trouble swallowing. They can also include digestive problems like vomiting or diarrhea.  All topics are updated as new evidence becomes available and our peer review process is complete.  This topic retrieved from [employer] on: Mar 13, 2024.  Topic 710348 Version 2.0  Release: 32.2.4 - C32.71  © 2024 UpToDate, Inc. and/or its affiliates. All rights reserved.  figure 1: How to wash your hands     Wet your hands with clean water, and apply a small amount of soap. Lather and rub hands together for at least 20 seconds. Clean your wrists, palms, backs of your hands, between your fingers, tips of your fingers, thumbs, and under and around your nails. Rinse well, and dry your hands using a clean towel.  Graphic 347941 Version 7.0  Consumer Information Use and Disclaimer   Disclaimer: This generalized information is a limited summary of diagnosis, treatment, and/or medication information. It is not meant to be comprehensive and should be used as a tool to help the user understand and/or assess  potential diagnostic and treatment options. It does NOT include all information about conditions, treatments, medications, side effects, or risks that may apply to a specific patient. It is not intended to be medical advice or a substitute for the medical advice, diagnosis, or treatment of a health care provider based on the health care provider's examination and assessment of a patient's specific and unique circumstances. Patients must speak with a health care provider for complete information about their health, medical questions, and treatment options, including any risks or benefits regarding use of medications. This information does not endorse any treatments or medications as safe, effective, or approved for treating a specific patient. UpToDate, Inc. and its affiliates disclaim any warranty or liability relating to this information or the use thereof.The use of this information is governed by the Terms of Use, available at https://www.sCoolTVuwer.com/en/know/clinical-effectiveness-terms. 2024© UpToDate, Inc. and its affiliates and/or licensors. All rights reserved.  Copyright   © 2024 UpToDate, Inc. and/or its affiliates. All rights reserved.

## 2025-01-16 NOTE — ASSESSMENT & PLAN NOTE
Continue on medication as ordered. Pt following CCN and pharmacist.   Lab Results   Component Value Date    HGBA1C 5.3 11/14/2024

## 2025-03-03 NOTE — ASSESSMENT & PLAN NOTE
"Reporting \"really bad allergies this year    my sinuses are really bad  \"  Patient is currently taking loratadine 10 mg daily  Advised patient to introduce daily OTC Flonase into her regimen  Per patient request, a referral was placed to an allergy specialist in case the loratadine/Flonase combination is ineffective    "
Hemoglobin A1c much improved today at 5 5  Diabetic foot exam WNL  Microalbumin/creatinine ratio urine sample collected  Encouraged patient to continue working with care management and clinical pharmacy services  We will follow-up with the patient in 3 months to reassess A1c         Lab Results   Component Value Date    HGBA1C 5 5 03/30/2023
Pt. Came into [facility] c/o chest pain.  Pt admits has had chest pain since last night. Pt admits is not constant but just had an episode 10 mins ago.  Pt. Taken to ER  via wheelchair with RN and .  
Repeat lipid panel ordered for patient to complete 1 week prior to her next follow-up 
Primary

## 2025-03-12 ENCOUNTER — RA CDI HCC (OUTPATIENT)
Dept: OTHER | Facility: HOSPITAL | Age: 45
End: 2025-03-12

## 2025-03-12 NOTE — PROGRESS NOTES
HCC coding opportunities       Chart reviewed, no opportunity found: CHART REVIEWED, NO OPPORTUNITY FOUND        Patients Insurance        Commercial Insurance: [employer]       E66.01: Morbid (severe) obesity due to excess calories (HCC) [6602135]     Per CMS/ICD 10 coding guidelines, to be used when BMI >=40

## 2025-03-20 ENCOUNTER — OFFICE VISIT (OUTPATIENT)
Dept: FAMILY MEDICINE CLINIC | Facility: CLINIC | Age: 45
End: 2025-03-20

## 2025-03-20 VITALS
OXYGEN SATURATION: 96 % | SYSTOLIC BLOOD PRESSURE: 108 MMHG | HEART RATE: 79 BPM | WEIGHT: 230.2 LBS | BODY MASS INDEX: 43.46 KG/M2 | HEIGHT: 61 IN | DIASTOLIC BLOOD PRESSURE: 66 MMHG | TEMPERATURE: 97.4 F

## 2025-03-20 DIAGNOSIS — E11.9 TYPE 2 DIABETES MELLITUS WITHOUT COMPLICATION, WITH LONG-TERM CURRENT USE OF INSULIN (HCC): ICD-10-CM

## 2025-03-20 DIAGNOSIS — Z79.4 TYPE 2 DIABETES MELLITUS WITHOUT COMPLICATION, WITH LONG-TERM CURRENT USE OF INSULIN (HCC): ICD-10-CM

## 2025-03-20 DIAGNOSIS — Z12.4 CERVICAL CANCER SCREENING: ICD-10-CM

## 2025-03-20 DIAGNOSIS — Z12.31 ENCOUNTER FOR SCREENING MAMMOGRAM FOR BREAST CANCER: ICD-10-CM

## 2025-03-20 DIAGNOSIS — Z00.00 ANNUAL PHYSICAL EXAM: Primary | ICD-10-CM

## 2025-03-20 LAB — SL AMB POCT HEMOGLOBIN AIC: 5.3 (ref ?–6.5)

## 2025-03-20 PROCEDURE — 99214 OFFICE O/P EST MOD 30 MIN: CPT | Performed by: NURSE PRACTITIONER

## 2025-03-20 PROCEDURE — 83036 HEMOGLOBIN GLYCOSYLATED A1C: CPT | Performed by: NURSE PRACTITIONER

## 2025-03-20 PROCEDURE — 99396 PREV VISIT EST AGE 40-64: CPT | Performed by: NURSE PRACTITIONER

## 2025-03-20 NOTE — PATIENT INSTRUCTIONS
"Patient Education     Routine physical for adults   The Basics   Written by the doctors and editors at [facility]   What is a physical? -- A physical is a routine visit, or \"check-up,\" with your doctor. You might also hear it called a \"wellness visit\" or \"preventive visit.\"  During each visit, the doctor will:   Ask about your physical and mental health   Ask about your habits, behaviors, and lifestyle   Do an exam   Give you vaccines if needed   Talk to you about any medicines you take   Give advice about your health   Answer your questions  Getting regular check-ups is an important part of taking care of your health. It can help your doctor find and treat any problems you have. But it's also important for preventing health problems.  A routine physical is different from a \"sick visit.\" A sick visit is when you see a doctor because of a health concern or problem. Since physicals are scheduled ahead of time, you can think about what you want to ask the doctor.  How often should I get a physical? -- It depends on your age and health. In general, for people age 21 years and older:   If you are younger than 50 years, you might be able to get a physical every 3 years.   If you are 50 years or older, your doctor might recommend a physical every year.  If you have an ongoing health condition, like diabetes or high blood pressure, your doctor will probably want to see you more often.  What happens during a physical? -- In general, each visit will include:   Physical exam - The doctor or nurse will check your height, weight, heart rate, and blood pressure. They will also look at your eyes and ears. They will ask about how you are feeling and whether you have any symptoms that bother you.   Medicines - It's a good idea to bring a list of all the medicines you take to each doctor visit. Your doctor will talk to you about your medicines and answer any questions. Tell them if you are having any side effects that bother you. You " "should also tell them if you are having trouble paying for any of your medicines.   Habits and behaviors - This includes:   Your diet   Your exercise habits   Whether you smoke, drink alcohol, or use drugs   Whether you are sexually active   Whether you feel safe at home  Your doctor will talk to you about things you can do to improve your health and lower your risk of health problems. They will also offer help and support. For example, if you want to quit smoking, they can give you advice and might prescribe medicines. If you want to improve your diet or get more physical activity, they can help you with this, too.   Lab tests, if needed - The tests you get will depend on your age and situation. For example, your doctor might want to check your:   Cholesterol   Blood sugar   Iron level   Vaccines - The recommended vaccines will depend on your age, health, and what vaccines you already had. Vaccines are very important because they can prevent certain serious or deadly infections.   Discussion of screening - \"Screening\" means checking for diseases or other health problems before they cause symptoms. Your doctor can recommend screening based on your age, risk, and preferences. This might include tests to check for:   Cancer, such as breast, prostate, cervical, ovarian, colorectal, prostate, lung, or skin cancer   Sexually transmitted infections, such as chlamydia and gonorrhea   Mental health conditions like depression and anxiety  Your doctor will talk to you about the different types of screening tests. They can help you decide which screenings to have. They can also explain what the results might mean.   Answering questions - The physical is a good time to ask the doctor or nurse questions about your health. If needed, they can refer you to other doctors or specialists, too.  Adults older than 65 years often need other care, too. As you get older, your doctor will talk to you about:   How to prevent falling at " home   Hearing or vision tests   Memory testing   How to take your medicines safely   Making sure that you have the help and support you need at home  All topics are updated as new evidence becomes available and our peer review process is complete.  This topic retrieved from [employer] on: May 02, 2024.  Topic 465288 Version 1.0  Release: 32.4.3 - C32.122  © 2024 UpToDate, Inc. and/or its affiliates. All rights reserved.  Consumer Information Use and Disclaimer   Disclaimer: This generalized information is a limited summary of diagnosis, treatment, and/or medication information. It is not meant to be comprehensive and should be used as a tool to help the user understand and/or assess potential diagnostic and treatment options. It does NOT include all information about conditions, treatments, medications, side effects, or risks that may apply to a specific patient. It is not intended to be medical advice or a substitute for the medical advice, diagnosis, or treatment of a health care provider based on the health care provider's examination and assessment of a patient's specific and unique circumstances. Patients must speak with a health care provider for complete information about their health, medical questions, and treatment options, including any risks or benefits regarding use of medications. This information does not endorse any treatments or medications as safe, effective, or approved for treating a specific patient. UpToDate, Inc. and its affiliates disclaim any warranty or liability relating to this information or the use thereof.The use of this information is governed by the Terms of Use, available at https://www.woltersMuzookauwer.com/en/know/clinical-effectiveness-terms. 2024© UpToDate, Inc. and its affiliates and/or licensors. All rights reserved.  Copyright   © 2024 UpToDate, Inc. and/or its affiliates. All rights reserved.

## 2025-03-20 NOTE — ASSESSMENT & PLAN NOTE
Well controlled at this time. Advised diabetic diet.   Diabetic Foot Exam completed today. [facility].   Lab Results   Component Value Date    HGBA1C 5.3 03/20/2025       Orders:  •  Lipid Panel with Direct LDL reflex; Future  •  Comprehensive metabolic panel; Future  •  CBC and differential; Future

## 2025-03-20 NOTE — PROGRESS NOTES
Adult Annual Physical  Name: Rosalia Rodriguez      : 1980      MRN: [id number]  Encounter Provider: MARCY Morales  Encounter Date: 3/20/2025   Encounter department: [facility] IN PARTNERSHIP WITH [facility]  Diabetic Foot Exam    Patient's shoes and socks removed.    Right Foot/Ankle   Right Foot Inspection  Skin Exam: skin normal and skin intact. No dry skin, no warmth, no callus, no erythema, no maceration, no abnormal color, no pre-ulcer, no ulcer and no callus.     Toe Exam: ROM and strength within normal limits.     Sensory   Vibration: intact  Proprioception: intact  Monofilament testing: intact    Vascular  Capillary refills: elevated  The right DP pulse is 2+. The right PT pulse is 2+.     Left Foot/Ankle  Left Foot Inspection  Skin Exam: skin normal and skin intact. No dry skin, no warmth, no erythema, no maceration, normal color, no pre-ulcer, no ulcer and no callus.     Toe Exam: ROM and strength within normal limits.     Sensory   Vibration: intact  Proprioception: intact  Monofilament testing: intact    Vascular  Capillary refills: elevated  The left DP pulse is 2+. The left PT pulse is 2+.     Assign Risk Category  No deformity present  No loss of protective sensation  No weak pulses  Risk: 0    Assessment & Plan  Annual physical exam  Advice and education were given regarding nutrition, aerobic exercises, weight bearing exercises, cardiovascular risk reduction, fall risk reduction, and age appropriate supplements. The patient was counseled regarding instructions for management, risk factor reductions, prognosis, risks and benefits of treatment options, patient and family education, and importance of compliance with treatment.    Orders:  •  Lipid Panel with Direct LDL reflex; Future  •  Comprehensive metabolic panel; Future  •  CBC and differential; Future    Cervical cancer screening    Orders:  •  Ambulatory Referral to Obstetrics /  Gynecology; Future  •  POCT hemoglobin A1c    Encounter for screening mammogram for breast cancer    Orders:  •  Mammo screening bilateral w 3d and cad; Future    Adult BMI 40.0-44.9 kg/sq m (HCC)  Goal to consume 500 to 1,000 fewer calories per day for a 1-2 lbs weight loss per week. Increase exercise to 30 min, 5 times a week as tolerated for a goal of 150 mins a week. Calorie tracking apps such as myfitness pal can be helpful for keeping track of calorie intake. Decrease simple carbohydrates (white bread, pasta, white rice), and increasing vegetables, fruit, and protein.  Increase water.    Orders:  •  Lipid Panel with Direct LDL reflex; Future  •  Comprehensive metabolic panel; Future  •  CBC and differential; Future    Type 2 diabetes mellitus without complication, with long-term current use of insulin ([facility])  Well controlled at this time. Advised diabetic diet.   Diabetic Foot Exam completed today. Pt reports DM eye exam UTD with [facility], will request records.   Lab Results   Component Value Date    HGBA1C 5.3 03/20/2025       Orders:  •  Lipid Panel with Direct LDL reflex; Future  •  Comprehensive metabolic panel; Future  •  CBC and differential; Future      Preventive Screenings:    - Breast cancer screening: screening up-to-date     Immunizations:  - Immunizations due: Influenza, Prevnar 20 and Tdap      Depression Screening and Follow-up Plan: Patient was screened for depression during today's encounter. They screened negative with a PHQ-2 score of 0.          History of Present Illness     Adult Annual Physical:  Patient presents for annual physical.     Diet and Physical Activity:  - Diet/Nutrition: well balanced diet.  - Exercise: walking.    Depression Screening:  - PHQ-2 Score: 0    General Health:  - Sleep: 7-8 hours of sleep on average.  - Hearing: normal hearing bilateral ears.  - Vision: no vision problems.  - Dental: regular dental visits.    /GYN Health:  - Follows with GYN: yes.     Review  "of Systems   Constitutional: Negative.  Negative for chills and fever.   HENT: Negative.  Negative for ear pain and sore throat.    Eyes:  Negative for pain and visual disturbance.   Respiratory: Negative.  Negative for cough and shortness of breath.    Cardiovascular:  Negative for chest pain and palpitations.   Gastrointestinal: Negative.  Negative for abdominal pain and vomiting.   Genitourinary: Negative.  Negative for dysuria and hematuria.   Musculoskeletal:  Negative for arthralgias and back pain.   Skin:  Negative for color change and rash.   Neurological: Negative.  Negative for seizures and syncope.   Psychiatric/Behavioral: Negative.     All other systems reviewed and are negative.        Objective   /66 (BP Location: Left arm, Patient Position: Sitting, Cuff Size: Large)   Pulse 79   Temp (!) 97.4 °F (36.3 °C) (Temporal)   Ht 5' 1\" (1.549 m)   Wt 104 kg (230 lb 3.2 oz)   SpO2 96%   BMI 43.50 kg/m²     Physical Exam  Vitals and nursing note reviewed.   Constitutional:       General: She is not in acute distress.     Appearance: She is well-developed.   HENT:      Head: Normocephalic and atraumatic.      Right Ear: Tympanic membrane, ear canal and external ear normal.      Left Ear: Tympanic membrane, ear canal and external ear normal.      Nose: Nose normal.      Mouth/Throat:      Mouth: Mucous membranes are moist.      Pharynx: Oropharynx is clear.   Eyes:      General:         Right eye: No discharge.         Left eye: No discharge.      Conjunctiva/sclera: Conjunctivae normal.      Pupils: Pupils are equal, round, and reactive to light.   Cardiovascular:      Rate and Rhythm: Normal rate and regular rhythm.      Pulses: no weak pulses.           Dorsalis pedis pulses are 2+ on the right side and 2+ on the left side.        Posterior tibial pulses are 2+ on the right side and 2+ on the left side.      Heart sounds: No murmur heard.  Pulmonary:      Effort: Pulmonary effort is normal. No " respiratory distress.      Breath sounds: Normal breath sounds.   Abdominal:      General: Bowel sounds are normal.      Palpations: Abdomen is soft.      Tenderness: There is no abdominal tenderness.   Musculoskeletal:         General: No swelling.      Cervical back: Neck supple.      Right lower leg: No edema.      Left lower leg: No edema.   Feet:      Right foot:      Skin integrity: No ulcer, skin breakdown, erythema, warmth, callus or dry skin.      Left foot:      Skin integrity: No ulcer, skin breakdown, erythema, warmth, callus or dry skin.   Skin:     General: Skin is warm and dry.      Capillary Refill: Capillary refill takes less than 2 seconds.   Neurological:      Mental Status: She is alert and oriented to person, place, and time.   Psychiatric:         Mood and Affect: Mood normal.         Behavior: Behavior normal.         Thought Content: Thought content normal.         Judgment: Judgment normal.

## 2025-03-31 ENCOUNTER — OFFICE VISIT (OUTPATIENT)
Age: 45
End: 2025-03-31

## 2025-03-31 VITALS
SYSTOLIC BLOOD PRESSURE: 124 MMHG | HEIGHT: 61 IN | BODY MASS INDEX: 43.43 KG/M2 | WEIGHT: 230 LBS | DIASTOLIC BLOOD PRESSURE: 84 MMHG | HEART RATE: 86 BPM | TEMPERATURE: 98.2 F | OXYGEN SATURATION: 99 %

## 2025-03-31 DIAGNOSIS — R05.1 ACUTE COUGH: ICD-10-CM

## 2025-03-31 DIAGNOSIS — J39.8 CONGESTION OF UPPER RESPIRATORY TRACT: ICD-10-CM

## 2025-03-31 DIAGNOSIS — J01.90 ACUTE SINUSITIS, RECURRENCE NOT SPECIFIED, UNSPECIFIED LOCATION: Primary | ICD-10-CM

## 2025-03-31 PROCEDURE — 99213 OFFICE O/P EST LOW 20 MIN: CPT | Performed by: STUDENT IN AN ORGANIZED HEALTH CARE EDUCATION/TRAINING PROGRAM

## 2025-03-31 PROCEDURE — AMOXICILLIN/POT CLAV AMOXICILLIN/POT CLAV: Performed by: STUDENT IN AN ORGANIZED HEALTH CARE EDUCATION/TRAINING PROGRAM

## 2025-03-31 NOTE — PROGRESS NOTES
Name: Rosalia Rodriguez      : 1980      MRN: [id number]  Encounter Provider: Bala Wilkerson DO  Encounter Date: 3/31/2025   Encounter department: [facility] IN PARTNERSHIP WITH [facility]  :  Assessment & Plan  Acute sinusitis, recurrence not specified, unspecified location    Orders:    amoxicillin-clavulanate (AUGMENTIN) 875-125 mg per tablet; Take 1 tablet by mouth every 12 (twelve) hours for 10 days    Acute cough    Orders:    amoxicillin-clavulanate (AUGMENTIN) 875-125 mg per tablet; Take 1 tablet by mouth every 12 (twelve) hours for 10 days    Congestion of upper respiratory tract    Orders:    amoxicillin-clavulanate (AUGMENTIN) 875-125 mg per tablet; Take 1 tablet by mouth every 12 (twelve) hours for 10 days             Is a 45-year-old female presenting today with concerns of sinus infection.  Her symptoms have been going on for the past week without improvement despite using antihistamines and nasal steroids.  She has a history of sinus infections.  I will treat her with Augmentin twice daily from our office supply.  Follow-up if not improved after treatment course.    Recommend supportive care with fluids, rest, flonase 1-2 sprays each nostril, otc claritin or zyrtec daily and mucinex as directed. Tylenol recommended prn for pain or fever.  Instructed pt RTO if symptoms persist or worsen over the course of the next week     History of Present Illness   HPI  Review of Systems   Constitutional:  Negative for fever.   HENT:  Positive for congestion and sinus pressure.    Respiratory:  Positive for cough. Negative for shortness of breath.    Cardiovascular:  Negative for chest pain.   Gastrointestinal:  Negative for abdominal pain, constipation and diarrhea.   Genitourinary:  Negative for difficulty urinating.   Skin:  Negative for rash.     Patient complains of congestion, sinus pressure, cough. Onset of symptoms was 7 days ago. Symptoms have been gradually  "worsening since that time. She is drinking plenty of fluids.  Past history is significant for nothing. Patient is non-smoker.  Patient has been consistently using an antihistamine along with Flonase for the past week    Objective   /84   Pulse 86   Temp 98.2 °F (36.8 °C)   Ht 5' 1\" (1.549 m)   Wt 104 kg (230 lb)   SpO2 99%   BMI 43.46 kg/m²      Physical Exam  Vitals and nursing note reviewed.   Constitutional:       General: She is not in acute distress.     Appearance: Normal appearance. She is well-developed. She is obese.   HENT:      Head: Normocephalic and atraumatic.      Right Ear: Tympanic membrane, ear canal and external ear normal.      Left Ear: Tympanic membrane, ear canal and external ear normal.      Nose: Congestion present.      Comments: Red nasal turbinates bilaterally     Mouth/Throat:      Mouth: Mucous membranes are moist.   Eyes:      Conjunctiva/sclera: Conjunctivae normal.   Cardiovascular:      Rate and Rhythm: Normal rate and regular rhythm.      Heart sounds: Normal heart sounds. No murmur heard.  Pulmonary:      Effort: Pulmonary effort is normal. No respiratory distress.      Breath sounds: Normal breath sounds. No wheezing.   Skin:     General: Skin is warm and dry.      Findings: No rash.   Neurological:      General: No focal deficit present.      Mental Status: She is alert and oriented to person, place, and time. Mental status is at baseline.   Psychiatric:         Mood and Affect: Mood normal.         Behavior: Behavior normal.         Thought Content: Thought content normal.         Judgment: Judgment normal.       Administrative Statements   I have spent a total time of 20 minutes in caring for this patient on the day of the visit/encounter including Instructions for management, Documenting in the medical record, and Obtaining or reviewing history  .  "

## 2025-04-08 ENCOUNTER — OFFICE VISIT (OUTPATIENT)
Age: 45
End: 2025-04-08
Payer: COMMERCIAL

## 2025-04-08 VITALS
SYSTOLIC BLOOD PRESSURE: 114 MMHG | BODY MASS INDEX: 43.88 KG/M2 | WEIGHT: 232.4 LBS | DIASTOLIC BLOOD PRESSURE: 76 MMHG | HEIGHT: 61 IN

## 2025-04-08 DIAGNOSIS — N94.6 DYSMENORRHEA: ICD-10-CM

## 2025-04-08 DIAGNOSIS — Z12.4 CERVICAL CANCER SCREENING: ICD-10-CM

## 2025-04-08 DIAGNOSIS — Z01.419 WELL FEMALE EXAM WITH ROUTINE GYNECOLOGICAL EXAM: Primary | ICD-10-CM

## 2025-04-08 DIAGNOSIS — Z12.31 VISIT FOR SCREENING MAMMOGRAM: ICD-10-CM

## 2025-04-08 PROCEDURE — S0610 ANNUAL GYNECOLOGICAL EXAMINA: HCPCS | Performed by: PHYSICIAN ASSISTANT

## 2025-04-08 PROCEDURE — G0145 SCR C/V CYTO,THINLAYER,RESCR: HCPCS | Performed by: PHYSICIAN ASSISTANT

## 2025-04-08 PROCEDURE — G0476 HPV COMBO ASSAY CA SCREEN: HCPCS | Performed by: PHYSICIAN ASSISTANT

## 2025-04-08 RX ORDER — NORETHINDRONE ACETATE AND ETHINYL ESTRADIOL .02; 1 MG/1; MG/1
1 TABLET ORAL DAILY
Qty: 84 TABLET | Refills: 1 | Status: SHIPPED | OUTPATIENT
Start: 2025-04-08

## 2025-04-08 NOTE — PROGRESS NOTES
Assessment & Plan  Well female exam with routine gynecological exam  Contraception: tubal ligation  Cervical cancer screening: pap collected today  STD screening: patient declines  Breast cancer screening: mammogram ordered  Colon cancer screening: up to date       Dysmenorrhea    Orders:    norethindrone-ethinyl estradiol (Junel 1/20) 1-20 MG-MCG per tablet; Take 1 tablet by mouth daily        CHIEF COMPLAINT:    Patient here for ROUTINE GYN EXAM.    SUBJECTIVE:    Patient is 45 y.o. year old female G 2 P 1.  She is here today for her routine gyn exam. She is using tubal ligation for birth control.     Menses every 28 days, duration x 4-5 days.  Pt does not smoke.  She denies alcohol/drug abuse.  She denies domestic violence/abuse.  She declines STD/HIV testing.  She denies problems with her breasts, bladder, or bowel.      Patient would like to restart junel 1/20 took in past for Mood swings, cramps, lighter menses. Patient says nothing concerning just likes the predictability of pill.   No migraine, HTN, smoking.      Pt with no history of abnormal pap smears.  We discussed the current ACOG pap guidelines.   Last pap was 3/4/1 and results were negative, HPV HR negative.      Domestic violence screen: negative    The patients medical, surgical, and family history was reviewed and updated.     Last mammogram: 8/13/24        Colon cancer screening: colonoscopy 1/8/24, repeat 10 years    Family History breast cancer:  no  Family History ovarian cancer: no  Family History colon cancer: no    Current Outpatient Medications on File Prior to Visit   Medication Sig Dispense Refill    amoxicillin-clavulanate (AUGMENTIN) 875-125 mg per tablet Take 1 tablet by mouth every 12 (twelve) hours for 10 days 20 tablet 0    ascorbic acid (VITAMIN C) 500 MG tablet Take 500 mg by mouth      loratadine (CLARITIN) 10 mg tablet Take 10 mg by mouth daily      Mounjaro 10 MG/0.5ML SOAJ Inject 0.5 mL (10 mg total) under the skin every 7  "days 2 mL 5    Multiple Vitamin (multivitamin) capsule Take 1 capsule by mouth daily       No current facility-administered medications on file prior to visit.       No Known Allergies      Past Surgical History:   Procedure Laterality Date     SECTION  2013    TUBAL LIGATION  2014         Review of Systems   Constitutional: Negative.    HENT: Negative.    Eyes: Negative.    Respiratory: Negative.    Cardiovascular: Negative.    Gastrointestinal: Negative.    Endocrine: Negative.    Genitourinary:        As noted in HPI   Musculoskeletal: Negative.    Skin: Negative.    Allergic/Immunologic: Negative.    Neurological: Negative.    Hematological: Negative.    Psychiatric/Behavioral: Negative    OBJECTIVE:    Vitals:    25 1311   BP: 114/76   BP Location: Right arm   Patient Position: Sitting   Cuff Size: Standard   Weight: 105 kg (232 lb 6.4 oz)   Height: 5' 1\" (1.549 m)       Chaperone was present during exam.    EXAM:    Neck: supple without nodes or thyromegaly  Heart: regular rate and rhythm  Lungs: clear to auscultation without rales, rhonci  Breasts: nontender, no masses, no discoloration, no discharge  Abdomen: soft, nontender, no masses  Ext genitalia: no lesions, no discoloration  Urethra: no discharge or erythema  Bladder: nontender, good support  Vagina: no discharge, no lesions  Cervix: no lesions, no cervical motion tenderness  Adnexa: nontender, no masses  Uterus: nontender, normal size and shape          "

## 2025-04-09 LAB
HPV HR 12 DNA CVX QL NAA+PROBE: NEGATIVE
HPV16 DNA CVX QL NAA+PROBE: NEGATIVE
HPV18 DNA CVX QL NAA+PROBE: NEGATIVE

## 2025-04-10 ENCOUNTER — RESULTS FOLLOW-UP (OUTPATIENT)
Dept: OBGYN CLINIC | Facility: CLINIC | Age: 45
End: 2025-04-10

## 2025-04-11 LAB
LAB AP GYN PRIMARY INTERPRETATION: NORMAL
Lab: NORMAL

## 2025-05-23 DIAGNOSIS — Z79.4 TYPE 2 DIABETES MELLITUS WITHOUT COMPLICATION, WITH LONG-TERM CURRENT USE OF INSULIN (HCC): ICD-10-CM

## 2025-05-23 DIAGNOSIS — E11.9 TYPE 2 DIABETES MELLITUS WITHOUT COMPLICATION, WITH LONG-TERM CURRENT USE OF INSULIN (HCC): ICD-10-CM

## 2025-05-23 RX ORDER — TIRZEPATIDE 10 MG/.5ML
10 INJECTION, SOLUTION SUBCUTANEOUS
Qty: 2 ML | Refills: 5 | Status: SHIPPED | OUTPATIENT
Start: 2025-05-23

## 2025-07-24 ENCOUNTER — RA CDI HCC (OUTPATIENT)
Dept: OTHER | Facility: HOSPITAL | Age: 45
End: 2025-07-24

## 2025-08-04 ENCOUNTER — OFFICE VISIT (OUTPATIENT)
Age: 45
End: 2025-08-04

## 2025-08-04 VITALS
WEIGHT: 228 LBS | HEART RATE: 68 BPM | DIASTOLIC BLOOD PRESSURE: 70 MMHG | SYSTOLIC BLOOD PRESSURE: 102 MMHG | HEIGHT: 61 IN | OXYGEN SATURATION: 99 % | BODY MASS INDEX: 43.05 KG/M2

## 2025-08-04 DIAGNOSIS — Z79.4 TYPE 2 DIABETES MELLITUS WITHOUT COMPLICATION, WITH LONG-TERM CURRENT USE OF INSULIN (HCC): Primary | ICD-10-CM

## 2025-08-04 DIAGNOSIS — E11.9 TYPE 2 DIABETES MELLITUS WITHOUT COMPLICATION, WITH LONG-TERM CURRENT USE OF INSULIN (HCC): Primary | ICD-10-CM

## 2025-08-04 LAB — SL AMB POCT HEMOGLOBIN AIC: 5.2 (ref ?–6.5)

## 2025-08-04 PROCEDURE — 99213 OFFICE O/P EST LOW 20 MIN: CPT | Performed by: STUDENT IN AN ORGANIZED HEALTH CARE EDUCATION/TRAINING PROGRAM

## 2025-08-04 PROCEDURE — 83036 HEMOGLOBIN GLYCOSYLATED A1C: CPT | Performed by: STUDENT IN AN ORGANIZED HEALTH CARE EDUCATION/TRAINING PROGRAM

## 2025-08-04 RX ORDER — NAPROXEN 500 MG/1
TABLET ORAL
COMMUNITY
Start: 2025-07-17

## 2025-08-05 LAB
ALBUMIN/CREAT UR: 17 MG/G CREAT
CREAT UR-MCNC: 521 MG/DL (ref 20–275)
MICROALBUMIN UR-MCNC: 9 MG/DL

## 2025-08-08 LAB
LEFT EYE DIABETIC RETINOPATHY: NORMAL
RIGHT EYE DIABETIC RETINOPATHY: NORMAL

## 2025-08-15 ENCOUNTER — CLINICAL SUPPORT (OUTPATIENT)
Dept: FAMILY MEDICINE CLINIC | Facility: CLINIC | Age: 45
End: 2025-08-15